# Patient Record
Sex: FEMALE | Race: WHITE | NOT HISPANIC OR LATINO | Employment: OTHER | URBAN - NONMETROPOLITAN AREA
[De-identification: names, ages, dates, MRNs, and addresses within clinical notes are randomized per-mention and may not be internally consistent; named-entity substitution may affect disease eponyms.]

---

## 2017-03-19 ENCOUNTER — PATIENT MESSAGE (OUTPATIENT)
Dept: INTERNAL MEDICINE | Facility: CLINIC | Age: 62
End: 2017-03-19

## 2017-03-20 ENCOUNTER — TELEPHONE (OUTPATIENT)
Dept: INTERNAL MEDICINE | Facility: CLINIC | Age: 62
End: 2017-03-20

## 2017-03-20 RX ORDER — BUPROPION HYDROCHLORIDE 150 MG/1
150 TABLET ORAL DAILY
Qty: 90 TABLET | Refills: 1 | Status: SHIPPED | OUTPATIENT
Start: 2017-03-20 | End: 2017-10-02 | Stop reason: SDUPTHER

## 2017-03-20 NOTE — TELEPHONE ENCOUNTER
----- Message from Lyssa Neal sent at 3/19/2017 11:41 AM EDT -----  Regarding: Prescription Question  Contact: [phone]    My prescription service, [facility], has sent you a renewal request for my buPROPion prescription.     While in [place], I have let it lapse and didn't think about it expiring. I do think it helps and it doesn't make me as tired as the previous prescriptions that I have tried.     I won't be back in [place] until mid-April. If you could renew this I'd appreciate it!    Thank you!    Shauna

## 2017-10-03 RX ORDER — BUPROPION HYDROCHLORIDE 150 MG/1
150 TABLET ORAL EVERY MORNING
Qty: 30 TABLET | Refills: 0 | Status: SHIPPED | OUTPATIENT
Start: 2017-10-03 | End: 2017-11-29 | Stop reason: SDUPTHER

## 2017-10-16 ENCOUNTER — TELEPHONE (OUTPATIENT)
Dept: INTERNAL MEDICINE | Facility: CLINIC | Age: 62
End: 2017-10-16

## 2017-10-16 NOTE — TELEPHONE ENCOUNTER
PLEASE RETURN CALL. PATIENT WOULD LIKE TO TALK TO YOU ABOUT AN APPOINTMENT AND HER RX. (WOULDN'T GIVE ME ANYMORE INFORMATION).

## 2017-11-29 ENCOUNTER — OFFICE VISIT (OUTPATIENT)
Dept: INTERNAL MEDICINE | Facility: CLINIC | Age: 62
End: 2017-11-29

## 2017-11-29 VITALS
BODY MASS INDEX: 18.66 KG/M2 | HEIGHT: 65 IN | DIASTOLIC BLOOD PRESSURE: 62 MMHG | HEART RATE: 81 BPM | TEMPERATURE: 97.3 F | WEIGHT: 112 LBS | SYSTOLIC BLOOD PRESSURE: 124 MMHG | OXYGEN SATURATION: 97 %

## 2017-11-29 DIAGNOSIS — F32.1 MAJOR DEPRESSIVE DISORDER, SINGLE EPISODE, MODERATE (HCC): Primary | ICD-10-CM

## 2017-11-29 PROCEDURE — 99213 OFFICE O/P EST LOW 20 MIN: CPT | Performed by: INTERNAL MEDICINE

## 2017-11-29 RX ORDER — BUPROPION HYDROCHLORIDE 150 MG/1
150 TABLET ORAL EVERY MORNING
Qty: 90 TABLET | Refills: 1 | Status: SHIPPED | OUTPATIENT
Start: 2017-11-29 | End: 2018-07-05 | Stop reason: SDUPTHER

## 2017-11-29 NOTE — PROGRESS NOTES
"Chief Complaint   Patient presents with   • Follow-up     for depressive disorder. No new complaints.      Subjective   Lyssa Neal is a 62 y.o. female.     HPI Comments: Patient is here today for follow-up of depression.  She has not been seen for almost one year now.  She was placed on wellbutrin last visit almost a yr ago and never returned for a followup.  She feels the med helps her anxiety and depression.   She is sleeping well.   [place].  She also gets her mammogram done there.   She is taking vit D and viactiv, but is not always consistant.   She declines flu vaccine today.  Lives in [place] half the yr. She does not want labs done until she returns next year in the spring.  Has no complaints today.       The following portions of the patient's history were reviewed and updated as appropriate: allergies, current medications, past family history, past medical history, past social history, past surgical history and problem list.    Review of Systems   Respiratory: Negative for shortness of breath.    Cardiovascular: Negative for chest pain and palpitations.   Gastrointestinal: Negative.    Psychiatric/Behavioral: Negative for dysphoric mood and sleep disturbance. The patient is not nervous/anxious.    All other systems reviewed and are negative.      Objective   /62  Pulse 81  Temp 97.3 °F (36.3 °C)  Ht 64.5\" (163.8 cm)  Wt 112 lb (50.8 kg)  SpO2 97%  BMI 18.93 kg/m2  Body mass index is 18.93 kg/(m^2).  Physical Exam   Constitutional: She is oriented to person, place, and time. She appears well-developed and well-nourished.   HENT:   Head: Normocephalic and atraumatic.   Eyes: EOM are normal. Pupils are equal, round, and reactive to light.   Neck: Normal range of motion. Neck supple. No thyromegaly present.   Cardiovascular: Normal rate, regular rhythm and normal heart sounds.    No murmur heard.  Pulmonary/Chest: Effort normal and breath sounds normal. No " respiratory distress.   Lymphadenopathy:     She has no cervical adenopathy.   Neurological: She is alert and oriented to person, place, and time. No cranial nerve deficit.   Psychiatric: She has a normal mood and affect. Judgment normal.   Nursing note and vitals reviewed.      Assessment/Plan   Lyssa Neal is here today and the following problems have been addressed:      Lyssa was seen today for follow-up.    Diagnoses and all orders for this visit:    Major depressive disorder, single episode, moderate    Other orders  -     buPROPion XL (WELLBUTRIN XL) 150 MG 24 hr tablet; Take 1 tablet by mouth Every Morning.    Recommend calcium 600 mg AM and PM  Vit D 2000 u daily  No med changes  Eat a HH diet  Exercise 30 min 5 days weekly  RF given on wellbutrin    RTC 6 mo for routine physical and labs  Please note that portions of this note were completed with a voice recognition program.  Efforts were made to edit dictation, but occasionally words are mistranscribed.

## 2017-12-13 ENCOUNTER — OFFICE VISIT (OUTPATIENT)
Dept: INTERNAL MEDICINE | Facility: CLINIC | Age: 62
End: 2017-12-13

## 2017-12-13 VITALS
WEIGHT: 105 LBS | TEMPERATURE: 98 F | HEART RATE: 91 BPM | SYSTOLIC BLOOD PRESSURE: 102 MMHG | HEIGHT: 64 IN | DIASTOLIC BLOOD PRESSURE: 60 MMHG | OXYGEN SATURATION: 97 % | BODY MASS INDEX: 17.93 KG/M2

## 2017-12-13 DIAGNOSIS — J06.9 ACUTE URI: Primary | ICD-10-CM

## 2017-12-13 PROCEDURE — 99213 OFFICE O/P EST LOW 20 MIN: CPT | Performed by: PHYSICIAN ASSISTANT

## 2017-12-13 RX ORDER — MELATONIN
2000 DAILY
COMMUNITY
End: 2020-06-15

## 2017-12-13 RX ORDER — DEXTROMETHORPHAN HYDROBROMIDE AND PROMETHAZINE HYDROCHLORIDE 15; 6.25 MG/5ML; MG/5ML
5 SYRUP ORAL NIGHTLY PRN
Qty: 180 ML | Refills: 0 | Status: SHIPPED | OUTPATIENT
Start: 2017-12-13 | End: 2018-05-30

## 2017-12-13 NOTE — PROGRESS NOTES
"Subjective     Chief Complaint: sore throat, cough    History of Present Illness     Lyssa Neal is a 62 y.o. female presents with 2 days sore throat, cough, rhinorrhea, hoarse voice.  Not sleeping well due to cough.  States her  was sick last week.  Has been using DayQuil and NyQuil OTC with some relief.  Denies fever, chills, body aches, ear pain, sinus pain, dyspnea, chest pain, N/V/D.    The following portions of the patient's history were reviewed and updated as appropriate: current medications, allergies, PMH.    Review of Systems   Constitutional: Negative for chills, fatigue and fever.   HENT: Positive for rhinorrhea, sore throat and voice change. Negative for ear pain and sinus pressure.    Respiratory: Positive for cough. Negative for shortness of breath and wheezing.    Cardiovascular: Negative.    Gastrointestinal: Negative.        Objective     Vitals:    12/13/17 1343   BP: 102/60   Pulse: 91   Temp: 98 °F (36.7 °C)   SpO2: 97%   Weight: 47.6 kg (105 lb)   Height: 163.8 cm (64.49\")       Physical Exam   Constitutional: She appears well-developed and well-nourished. No distress.   Patient appears well.   HENT:   Head: Normocephalic and atraumatic.   Right Ear: Tympanic membrane and external ear normal.   Left Ear: Tympanic membrane normal.   Nose: Rhinorrhea present. Right sinus exhibits no maxillary sinus tenderness and no frontal sinus tenderness. Left sinus exhibits no maxillary sinus tenderness and no frontal sinus tenderness.   Mouth/Throat: Mucous membranes are dry. Posterior oropharyngeal erythema present. No oropharyngeal exudate or posterior oropharyngeal edema.   Cardiovascular: Normal rate, regular rhythm and normal heart sounds.    Pulmonary/Chest: Effort normal.   Lymphadenopathy:     She has no cervical adenopathy.            Assessment/Plan     Diagnoses and all orders for this visit:    Acute URI  -     promethazine-dextromethorphan (PROMETHAZINE-DM) 6.25-15 MG/5ML syrup; Take " 5 mL by mouth At Night As Needed for Cough.    Patient encouraged to rest and stay well-hydrated.  May continue using cough and cold medicine OTC during the day.  May use Promethazine DM syrup to help her rest at night.  Gargle with warm salt water, Chloraseptic spray, Tylenol/Motrin for throat pain.  May consider antibiotic later if symptoms do not begin to improve.  RTC if symptoms worsen or fail to improve.    Nemo Darling PA-C  12/13/2017         Please note that portions of this note were completed with a voice recognition program. Efforts were made to edit dictation, but occasionally words are mistranscribed.

## 2018-05-30 ENCOUNTER — OFFICE VISIT (OUTPATIENT)
Dept: INTERNAL MEDICINE | Facility: CLINIC | Age: 63
End: 2018-05-30

## 2018-05-30 VITALS
TEMPERATURE: 97.6 F | DIASTOLIC BLOOD PRESSURE: 66 MMHG | BODY MASS INDEX: 17.66 KG/M2 | WEIGHT: 106 LBS | OXYGEN SATURATION: 94 % | SYSTOLIC BLOOD PRESSURE: 124 MMHG | HEIGHT: 65 IN | HEART RATE: 90 BPM

## 2018-05-30 DIAGNOSIS — M19.90 ARTHRITIS: ICD-10-CM

## 2018-05-30 DIAGNOSIS — M81.0 AGE-RELATED OSTEOPOROSIS WITHOUT CURRENT PATHOLOGICAL FRACTURE: ICD-10-CM

## 2018-05-30 DIAGNOSIS — Z00.00 PHYSICAL EXAM: Primary | ICD-10-CM

## 2018-05-30 LAB
25(OH)D3+25(OH)D2 SERPL-MCNC: 40.9 NG/ML
ALBUMIN SERPL-MCNC: 4.4 G/DL (ref 3.5–5)
ALBUMIN/GLOB SERPL: 1.3 G/DL (ref 1–2)
ALP SERPL-CCNC: 52 U/L (ref 38–126)
ALT SERPL-CCNC: 32 U/L (ref 13–69)
AST SERPL-CCNC: 33 U/L (ref 15–46)
BASOPHILS # BLD AUTO: 0.1 10*3/MM3 (ref 0–0.2)
BASOPHILS NFR BLD AUTO: 1.6 % (ref 0–2.5)
BILIRUB SERPL-MCNC: 0.4 MG/DL (ref 0.2–1.3)
BUN SERPL-MCNC: 7 MG/DL (ref 7–20)
BUN/CREAT SERPL: 14 (ref 7.1–23.5)
CALCIUM SERPL-MCNC: 9.8 MG/DL (ref 8.4–10.2)
CHLORIDE SERPL-SCNC: 98 MMOL/L (ref 98–107)
CHOLEST SERPL-MCNC: 163 MG/DL (ref 0–199)
CHOLEST/HDLC SERPL: 1.81 {RATIO}
CO2 SERPL-SCNC: 26 MMOL/L (ref 26–30)
CREAT SERPL-MCNC: 0.5 MG/DL (ref 0.6–1.3)
EOSINOPHIL # BLD AUTO: 0.39 10*3/MM3 (ref 0–0.7)
EOSINOPHIL NFR BLD AUTO: 6.4 % (ref 0–7)
ERYTHROCYTE [DISTWIDTH] IN BLOOD BY AUTOMATED COUNT: 12.4 % (ref 11.5–14.5)
GFR SERPLBLD CREATININE-BSD FMLA CKD-EPI: 124 ML/MIN/1.73
GFR SERPLBLD CREATININE-BSD FMLA CKD-EPI: >150 ML/MIN/1.73
GLOBULIN SER CALC-MCNC: 3.4 GM/DL
GLUCOSE SERPL-MCNC: 86 MG/DL (ref 74–98)
HCT VFR BLD AUTO: 37.2 % (ref 37–47)
HDLC SERPL-MCNC: 90 MG/DL (ref 40–60)
HGB BLD-MCNC: 12.4 G/DL (ref 12–16)
IMM GRANULOCYTES # BLD: 0.02 10*3/MM3 (ref 0–0.06)
IMM GRANULOCYTES NFR BLD: 0.3 % (ref 0–0.6)
LDLC SERPL CALC-MCNC: 54 MG/DL (ref 0–99)
LYMPHOCYTES # BLD AUTO: 1.43 10*3/MM3 (ref 0.6–3.4)
LYMPHOCYTES NFR BLD AUTO: 23.3 % (ref 10–50)
MCH RBC QN AUTO: 31.8 PG (ref 27–31)
MCHC RBC AUTO-ENTMCNC: 33.3 G/DL (ref 30–37)
MCV RBC AUTO: 95.4 FL (ref 81–99)
MONOCYTES # BLD AUTO: 0.7 10*3/MM3 (ref 0–0.9)
MONOCYTES NFR BLD AUTO: 11.4 % (ref 0–12)
NEUTROPHILS # BLD AUTO: 3.5 10*3/MM3 (ref 2–6.9)
NEUTROPHILS NFR BLD AUTO: 57 % (ref 37–80)
NRBC BLD AUTO-RTO: 0 /100 WBC (ref 0–0)
PLATELET # BLD AUTO: 256 10*3/MM3 (ref 130–400)
POTASSIUM SERPL-SCNC: 4.4 MMOL/L (ref 3.5–5.1)
PROT SERPL-MCNC: 7.8 G/DL (ref 6.3–8.2)
RBC # BLD AUTO: 3.9 10*6/MM3 (ref 4.2–5.4)
SODIUM SERPL-SCNC: 137 MMOL/L (ref 137–145)
TRIGL SERPL-MCNC: 95 MG/DL
VLDLC SERPL CALC-MCNC: 19 MG/DL
WBC # BLD AUTO: 6.14 10*3/MM3 (ref 4.8–10.8)

## 2018-05-30 PROCEDURE — 99396 PREV VISIT EST AGE 40-64: CPT | Performed by: INTERNAL MEDICINE

## 2018-05-30 NOTE — PROGRESS NOTES
Chief Complaint   Patient presents with   • Annual Exam     Physical only. Pt state her arthritis is really affecting her joints, would like to know what she should do?     Subjective   Lyssa Neal is a 62 y.o. female.     Pt here today for annual physical.   PMH of OP and depression.   She sees her GYN for pap and mammogram every summer.  Last colonoscopy was in 2013 and was normal.    She gets occasional RLQ pain.  Has been seen for this several times and had a colonoscopy for this pain and it was all normal.  Her depression has been well controlled with use of wellbutrin.  She sleeps about 6 hours a night.    She is taking her calcium with vit D.   Has not been exercising.  When she travels to [place] she exercises regularly.    Tries to eat a HH diet.   She sees the eye doctor and dentist regularly.   The DJD in her hands is quite bothersome.  She is getting nodules at DIP joints. The pain is not constant, occurs when her fingers are pulled or grabbed.   Has also noted she is not remembering things as quickly.  No dementia in her family.   She does wear her seatbelt and does not text and drive.            The following portions of the patient's history were reviewed and updated as appropriate: allergies, current medications, past family history, past medical history, past social history, past surgical history and problem list.    Review of Systems   Constitutional: Negative for activity change, appetite change and unexpected weight change.   Respiratory: Negative for shortness of breath.    Cardiovascular: Negative for chest pain and palpitations.   Gastrointestinal: Negative.    Genitourinary: Negative.    Musculoskeletal: Positive for arthralgias.   Allergic/Immunologic: Negative for environmental allergies.   Psychiatric/Behavioral: Negative for dysphoric mood and sleep disturbance. The patient is not nervous/anxious.    All other systems reviewed and are negative.      Objective   /66   Pulse 90    "Temp 97.6 °F (36.4 °C)   Ht 163.8 cm (64.5\")   Wt 48.1 kg (106 lb)   SpO2 94%   BMI 17.91 kg/m²   Body mass index is 17.91 kg/m².  Physical Exam   Constitutional: She is oriented to person, place, and time. She appears well-developed and well-nourished.   HENT:   Head: Normocephalic and atraumatic.   Right Ear: External ear normal.   Left Ear: External ear normal.   Mouth/Throat: Oropharynx is clear and moist.   Eyes: Conjunctivae and EOM are normal. Pupils are equal, round, and reactive to light.   Neck: Normal range of motion. Neck supple. No thyromegaly present.   Cardiovascular: Normal rate, regular rhythm, normal heart sounds and intact distal pulses.    No murmur heard.  Pulmonary/Chest: Effort normal and breath sounds normal. No respiratory distress.   Abdominal: Soft. Bowel sounds are normal.   Musculoskeletal: Normal range of motion. She exhibits no edema.   Lymphadenopathy:     She has no cervical adenopathy.   Neurological: She is alert and oriented to person, place, and time. She displays normal reflexes. No cranial nerve deficit.   Skin: No rash noted.   Psychiatric: She has a normal mood and affect. Judgment normal.   Nursing note and vitals reviewed.      Assessment/Plan   Lyssa Neal is here today and the following problems have been addressed:      Lyssa was seen today for annual exam.    Diagnoses and all orders for this visit:    Physical exam  -     CBC & Differential  -     Comprehensive Metabolic Panel  -     Lipid Panel With / Chol / HDL Ratio  -     Vitamin D 25 Hydroxy    Age-related osteoporosis without current pathological fracture  -     Vitamin D 25 Hydroxy    Arthritis    Labs as noted  Recommend avoidance of processed foods  Exercise for 30 minutes 5 days a week as tolerated  Recommend annual eye doctor appointment, or as necessary  See your dentist twice a year.  Recommend that you brush teeth twice daily and floss minimum of once daily  Recommend regular seatbelt use  Do not " text or use phone while driving  Recommend tylenol ES or aleve for hand DJD pain  See GYN for pap and mammogram    RTC one yr for medicare wellness exam        Please note that portions of this note were completed with a voice recognition program.  Efforts were made to edit dictation, but occasionally words are mistranscribed.

## 2018-07-05 RX ORDER — BUPROPION HYDROCHLORIDE 150 MG/1
TABLET ORAL
Qty: 90 TABLET | Refills: 1 | Status: SHIPPED | OUTPATIENT
Start: 2018-07-05 | End: 2018-08-30 | Stop reason: SDUPTHER

## 2018-08-30 RX ORDER — BUPROPION HYDROCHLORIDE 150 MG/1
150 TABLET ORAL EVERY MORNING
Qty: 90 TABLET | Refills: 1 | Status: SHIPPED | OUTPATIENT
Start: 2018-08-30 | End: 2019-04-04 | Stop reason: SDUPTHER

## 2019-04-04 RX ORDER — BUPROPION HYDROCHLORIDE 150 MG/1
150 TABLET ORAL EVERY MORNING
Qty: 90 TABLET | Refills: 0 | Status: SHIPPED | OUTPATIENT
Start: 2019-04-04 | End: 2019-04-09 | Stop reason: SDUPTHER

## 2019-04-10 RX ORDER — BUPROPION HYDROCHLORIDE 150 MG/1
150 TABLET ORAL EVERY MORNING
Qty: 90 TABLET | Refills: 1 | Status: SHIPPED | OUTPATIENT
Start: 2019-04-10 | End: 2019-12-05 | Stop reason: SDUPTHER

## 2019-07-01 ENCOUNTER — RESULTS ENCOUNTER (OUTPATIENT)
Dept: INTERNAL MEDICINE | Facility: CLINIC | Age: 64
End: 2019-07-01

## 2019-07-01 ENCOUNTER — OFFICE VISIT (OUTPATIENT)
Dept: INTERNAL MEDICINE | Facility: CLINIC | Age: 64
End: 2019-07-01

## 2019-07-01 VITALS
DIASTOLIC BLOOD PRESSURE: 64 MMHG | HEIGHT: 65 IN | OXYGEN SATURATION: 98 % | WEIGHT: 105 LBS | HEART RATE: 89 BPM | TEMPERATURE: 97.5 F | SYSTOLIC BLOOD PRESSURE: 118 MMHG | BODY MASS INDEX: 17.49 KG/M2

## 2019-07-01 DIAGNOSIS — M81.0 AGE-RELATED OSTEOPOROSIS WITHOUT CURRENT PATHOLOGICAL FRACTURE: ICD-10-CM

## 2019-07-01 DIAGNOSIS — Z00.00 INITIAL MEDICARE ANNUAL WELLNESS VISIT: Primary | ICD-10-CM

## 2019-07-01 DIAGNOSIS — M19.90 ARTHRITIS: ICD-10-CM

## 2019-07-01 DIAGNOSIS — F32.1 MAJOR DEPRESSIVE DISORDER, SINGLE EPISODE, MODERATE (HCC): ICD-10-CM

## 2019-07-01 DIAGNOSIS — Z12.11 SCREENING FOR COLON CANCER: ICD-10-CM

## 2019-07-01 LAB
25(OH)D3+25(OH)D2 SERPL-MCNC: 55.2 NG/ML
ALBUMIN SERPL-MCNC: 4 G/DL (ref 3.5–5)
ALBUMIN/GLOB SERPL: 1.3 G/DL (ref 1–2)
ALP SERPL-CCNC: 38 U/L (ref 38–126)
ALT SERPL-CCNC: 34 U/L (ref 13–69)
AST SERPL-CCNC: 31 U/L (ref 15–46)
BASOPHILS # BLD AUTO: 0.07 10*3/MM3 (ref 0–0.2)
BASOPHILS NFR BLD AUTO: 1.2 % (ref 0–1.5)
BILIRUB SERPL-MCNC: 0.2 MG/DL (ref 0.2–1.3)
BUN SERPL-MCNC: 10 MG/DL (ref 7–20)
BUN/CREAT SERPL: 20 (ref 7.1–23.5)
CALCIUM SERPL-MCNC: 8.4 MG/DL (ref 8.4–10.2)
CHLORIDE SERPL-SCNC: 99 MMOL/L (ref 98–107)
CO2 SERPL-SCNC: 29 MMOL/L (ref 26–30)
CREAT SERPL-MCNC: 0.5 MG/DL (ref 0.6–1.3)
EOSINOPHIL # BLD AUTO: 0.11 10*3/MM3 (ref 0–0.4)
EOSINOPHIL NFR BLD AUTO: 1.9 % (ref 0.3–6.2)
ERYTHROCYTE [DISTWIDTH] IN BLOOD BY AUTOMATED COUNT: 13 % (ref 12.3–15.4)
GLOBULIN SER CALC-MCNC: 3.2 GM/DL
GLUCOSE SERPL-MCNC: 75 MG/DL (ref 74–98)
HCT VFR BLD AUTO: 38 % (ref 34–46.6)
HGB BLD-MCNC: 12.3 G/DL (ref 12–15.9)
IMM GRANULOCYTES # BLD AUTO: 0.01 10*3/MM3 (ref 0–0.05)
IMM GRANULOCYTES NFR BLD AUTO: 0.2 % (ref 0–0.5)
LYMPHOCYTES # BLD AUTO: 1.36 10*3/MM3 (ref 0.7–3.1)
LYMPHOCYTES NFR BLD AUTO: 22.9 % (ref 19.6–45.3)
MCH RBC QN AUTO: 31 PG (ref 26.6–33)
MCHC RBC AUTO-ENTMCNC: 32.4 G/DL (ref 31.5–35.7)
MCV RBC AUTO: 95.7 FL (ref 79–97)
MONOCYTES # BLD AUTO: 0.82 10*3/MM3 (ref 0.1–0.9)
MONOCYTES NFR BLD AUTO: 13.8 % (ref 5–12)
NEUTROPHILS # BLD AUTO: 3.57 10*3/MM3 (ref 1.7–7)
NEUTROPHILS NFR BLD AUTO: 60 % (ref 42.7–76)
NRBC BLD AUTO-RTO: 0 /100 WBC (ref 0–0.2)
PLATELET # BLD AUTO: 281 10*3/MM3 (ref 140–450)
POTASSIUM SERPL-SCNC: 4.3 MMOL/L (ref 3.5–5.1)
PROT SERPL-MCNC: 7.2 G/DL (ref 6.3–8.2)
RBC # BLD AUTO: 3.97 10*6/MM3 (ref 3.77–5.28)
SODIUM SERPL-SCNC: 137 MMOL/L (ref 137–145)
TSH SERPL DL<=0.005 MIU/L-ACNC: 2.2 MIU/ML (ref 0.47–4.68)
WBC # BLD AUTO: 5.94 10*3/MM3 (ref 3.4–10.8)

## 2019-07-01 PROCEDURE — G0402 INITIAL PREVENTIVE EXAM: HCPCS | Performed by: INTERNAL MEDICINE

## 2019-07-01 PROCEDURE — G0404 EKG TRACING FOR INITIAL PREV: HCPCS | Performed by: INTERNAL MEDICINE

## 2019-07-01 NOTE — PROGRESS NOTES
QUICK REFERENCE INFORMATION:  The ABCs of the Annual Wellness Visit    Subsequent Medicare Wellness Visit     HEALTH RISK ASSESSMENT    : 1955    Recent Hospitalizations:  No hospitalization(s) within the last year..  ccc      Current Medical Providers:  Patient Care Team:  Vermeesch, Marilyn K, MD as PCP - General        Smoking Status:  Social History     Tobacco Use   Smoking Status Never Smoker       Alcohol Consumption:  Social History     Substance and Sexual Activity   Alcohol Use Yes    Comment: Social       Depression Screen:   PHQ-2/PHQ-9 Depression Screening 2019   Little interest or pleasure in doing things 0   Feeling down, depressed, or hopeless 2   Trouble falling or staying asleep, or sleeping too much 0   Feeling tired or having little energy 1   Poor appetite or overeating 0   Feeling bad about yourself - or that you are a failure or have let yourself or your family down 0   Trouble concentrating on things, such as reading the newspaper or watching television 0   Moving or speaking so slowly that other people could have noticed. Or the opposite - being so fidgety or restless that you have been moving around a lot more than usual 0   Thoughts that you would be better off dead, or of hurting yourself in some way 0   Total Score 3   If you checked off any problems, how difficult have these problems made it for you to do your work, take care of things at home, or get along with other people? Not difficult at all       Health Habits and Functional and Cognitive Screening:  Functional & Cognitive Status 2019   Do you have difficulty preparing food and eating? No   Do you have difficulty bathing yourself, getting dressed or grooming yourself? No   Do you have difficulty using the toilet? No   Do you have difficulty moving around from place to place? No   Do you have trouble with steps or getting out of a bed or a chair? No   In the past year have you fallen or experienced a near fall? No    Current Diet Well Balanced Diet   Dental Exam Up to date   Eye Exam Up to date   Exercise (times per week) 0 times per week   Current Exercise Activities Include None   Do you need help using the phone?  No   Are you deaf or do you have serious difficulty hearing?  No   Do you need help with transportation? No   Do you need help shopping? No   Do you need help preparing meals?  No   Do you need help with housework?  No   Do you need help with laundry? No   Do you need help taking your medications? No   Do you need help managing money? No   Do you ever drive or ride in a car without wearing a seat belt? No   Have you felt unusual stress, anger or loneliness in the last month? Yes   Who do you live with? Spouse   If you need help, do you have trouble finding someone available to you? No   Do you have difficulty concentrating, remembering or making decisions? No           Does the patient have evidence of cognitive impairment? No    Asiprin use counseling: Does not need ASA (and currently is not on it)      Recent Lab Results:    Lab Results   Component Value Date    GLU 86 05/30/2018        Lab Results   Component Value Date    TRIG 95 05/30/2018    HDL 90 (H) 05/30/2018    VLDL 19 05/30/2018           Age-appropriate Screening Schedule:  Refer to the list below for future screening recommendations based on patient's age, sex and/or medical conditions. Orders for these recommended tests are listed in the plan section. The patient has been provided with a written plan.    Health Maintenance   Topic Date Due   • DXA SCAN  08/24/2016   • MAMMOGRAM  10/05/2017   • PNEUMOCOCCAL VACCINES (65+ LOW/MEDIUM RISK) (1 of 2 - PCV13) 07/01/2019 (Originally 8/10/2018)   • INFLUENZA VACCINE  08/01/2019   • PAP SMEAR  05/31/2020   • COLONOSCOPY  07/31/2023   • TDAP/TD VACCINES  Discontinued   • ZOSTER VACCINE  Discontinued        Subjective   History of Present Illness    Lyssa Neal is a 63 y.o. female who presents for an  Annual Wellness Visit.  PMH of DJD, OP, depression and anemia. She has some stressors with her family and neighbors currently.  She does not always take her calcium and vit D. She remains on estradiol 2 mg daily, she will see her GYN next month.  She will have a DEXA and mammogram per GYN.  She is using CBD oil for her DJD and it really helps her a lot.  She admits that she has a few alcoholic beverages every night.    The following portions of the patient's history were reviewed and updated as appropriate: allergies, current medications, past family history, past medical history, past social history, past surgical history and problem list.    Outpatient Medications Prior to Visit   Medication Sig Dispense Refill   • CBD (cannabidiol) oral oil Take  by mouth.     • Biotin 1 MG capsule Take  by mouth.     • buPROPion XL (WELLBUTRIN XL) 150 MG 24 hr tablet Take 1 tablet by mouth Every Morning. 90 tablet 1   • calcium citrate-vitamin d (CITRACAL) 200-250 MG-UNIT tablet tablet Take  by mouth Daily.     • cholecalciferol (VITAMIN D3) 1000 units tablet Take 2,000 Units by mouth Daily.     • estradiol (ESTRACE) 2 MG tablet Take 2 mg by mouth daily.     • Multiple Vitamins-Minerals (CENTRUM SILVER ULTRA WOMENS PO) Take  by mouth daily.     • Omega-3 Fatty Acids ([employer] NATURAL FISH OIL) 1000 MG capsule Take  by mouth daily.     • Probiotic Product (PROBIOTIC DAILY PO) Take  by mouth.       No facility-administered medications prior to visit.        Patient Active Problem List   Diagnosis   • Actinic keratosis   • Arthritis   • Osteoporosis   • Osteoarthritis of finger of left hand   • Major depressive disorder, single episode, moderate (CMS/HCC)   • Initial Medicare annual wellness visit   • Screening for colon cancer       Advance Care Planning:  Patient does not have an advance directive - information provided to the patient today    Identification of Risk Factors:  Risk factors include: weight , inactivity and alcohol  use.    Review of Systems   Constitutional: Negative.    HENT: Positive for hearing loss.         Mild hearing loss in right ear   Eyes: Negative.    Respiratory: Negative.    Cardiovascular: Negative.    Gastrointestinal: Negative.    Endocrine: Negative.    Genitourinary: Negative.    Musculoskeletal: Positive for arthralgias.   Skin: Negative.    Allergic/Immunologic: Positive for environmental allergies.   Neurological: Negative.    Hematological: Negative.    Psychiatric/Behavioral: The patient is nervous/anxious.        Compared to one year ago, the patient feels her physical health is the same.  Compared to one year ago, the patient feels her mental health is the same.    Objective     Physical Exam   Constitutional: She is oriented to person, place, and time. She appears well-developed and well-nourished. No distress.   Very pleasant thin female who appears her stated age, no distress today   HENT:   Head: Normocephalic and atraumatic.   Right Ear: External ear normal.   Left Ear: External ear normal.   Mouth/Throat: Oropharynx is clear and moist.   Eyes: Conjunctivae and EOM are normal. Pupils are equal, round, and reactive to light.   Neck: Normal range of motion. Neck supple. No thyromegaly present.   Cardiovascular: Normal rate, regular rhythm, normal heart sounds and intact distal pulses.   No murmur heard.  No carotid bruits   Pulmonary/Chest: Effort normal and breath sounds normal. No respiratory distress. She has no wheezes.   Abdominal: Soft. Bowel sounds are normal. She exhibits no distension. There is no tenderness.   Musculoskeletal: Normal range of motion. She exhibits no edema.   Lymphadenopathy:     She has no cervical adenopathy.   Neurological: She is alert and oriented to person, place, and time. She displays normal reflexes. No cranial nerve deficit. She exhibits normal muscle tone. Coordination normal.   Psychiatric: Her behavior is normal. Judgment and thought content normal.   Overall  "pleasant mood, however, she appears anxious   Nursing note and vitals reviewed.    ECG 12 Lead  Date/Time: 7/1/2019 5:07 PM  Performed by: Vermeesch, Marilyn K, MD  Authorized by: Vermeesch, Marilyn K, MD   Comparison: not compared with previous ECG   Rhythm: sinus rhythm  Rate: normal  BPM: 75  Conduction: conduction normal  ST Segments: ST segments normal  T Waves: T waves normal  QRS axis: normal  Other: no other findings    Clinical impression: normal ECG            Vitals:    07/01/19 1328   BP: 118/64   Pulse: 89   Temp: 97.5 °F (36.4 °C)   SpO2: 98%   Weight: 47.6 kg (105 lb)   Height: 163.8 cm (64.5\")   PainSc: 0-No pain       Patient's Body mass index is 17.74 kg/m². BMI is below normal parameters. Recommendations include: consider ensure/boost supplement daily.      Assessment/Plan   Patient Self-Management and Personalized Health Advice  The patient has been provided with information about: exercise, alcohol use, designing advance directives and supplements and preventive services including:   · Advance directive, Alcohol use counseling completed, Colorectal cancer screening, cologuard test ordered, Diabetes screening, see lab orders, Exercise counseling provided, recommend Hep A and shingles vaccines.    Visit Diagnoses:    ICD-10-CM ICD-9-CM   1. Initial Medicare annual wellness visit Z00.00 V70.0   2. Age-related osteoporosis without current pathological fracture M81.0 733.01   3. Arthritis M19.90 716.90   4. Major depressive disorder, single episode, moderate (CMS/HCC) F32.1 296.22   5. Screening for colon cancer Z12.11 V76.51       Orders Placed This Encounter   Procedures   • Comprehensive Metabolic Panel   • TSH   • Vitamin D 25 Hydroxy   • Cologuard - Stool, Per Rectum     Standing Status:   Future     Standing Expiration Date:   7/1/2020   • CBC & Differential     Order Specific Question:   Manual Differential     Answer:   No       Outpatient Encounter Medications as of 7/1/2019   Medication Sig " Dispense Refill   • CBD (cannabidiol) oral oil Take  by mouth.     • Biotin 1 MG capsule Take  by mouth.     • buPROPion XL (WELLBUTRIN XL) 150 MG 24 hr tablet Take 1 tablet by mouth Every Morning. 90 tablet 1   • calcium citrate-vitamin d (CITRACAL) 200-250 MG-UNIT tablet tablet Take  by mouth Daily.     • cholecalciferol (VITAMIN D3) 1000 units tablet Take 2,000 Units by mouth Daily.     • estradiol (ESTRACE) 2 MG tablet Take 2 mg by mouth daily.     • Multiple Vitamins-Minerals (CENTRUM SILVER ULTRA WOMENS PO) Take  by mouth daily.     • Omega-3 Fatty Acids ([employer] NATURAL FISH OIL) 1000 MG capsule Take  by mouth daily.     • Probiotic Product (PROBIOTIC DAILY PO) Take  by mouth.       No facility-administered encounter medications on file as of 7/1/2019.        Reviewed use of high risk medication in the elderly: yes  Reviewed for potential of harmful drug interactions in the elderly: not applicable     Given info on advance directives, request copy for chart  She declines pneumovax  Recommend Hep A and shingles vaccine at pharmacy  Labs to be done when fasting  Cologuard ordered for colon cancer screening  She will see GYN and have her mammo and DEXA-she receives her hormone replacement therapy from gynecologist  Encouraged her to take calcium and vit D daily  Exercise for 30 min 5 days a week  We discussed need to cut back on daily consumption of alcohol  She feels that CBD oil is helpful for her underlying anxiety symptoms  EKG is normal    Follow Up:  Return in about 1 year (around 7/1/2020) for Medicare Wellness.     An After Visit Summary and PPPS with all of these plans were given to the patient.

## 2019-12-05 RX ORDER — BUPROPION HYDROCHLORIDE 150 MG/1
150 TABLET ORAL EVERY MORNING
Qty: 90 TABLET | Refills: 1 | Status: SHIPPED | OUTPATIENT
Start: 2019-12-05 | End: 2020-04-06 | Stop reason: SDUPTHER

## 2020-01-10 ENCOUNTER — OFFICE VISIT (OUTPATIENT)
Dept: INTERNAL MEDICINE | Facility: CLINIC | Age: 65
End: 2020-01-10

## 2020-01-10 VITALS
HEART RATE: 77 BPM | OXYGEN SATURATION: 98 % | SYSTOLIC BLOOD PRESSURE: 126 MMHG | HEIGHT: 64 IN | WEIGHT: 101 LBS | DIASTOLIC BLOOD PRESSURE: 72 MMHG | BODY MASS INDEX: 17.24 KG/M2 | TEMPERATURE: 98.2 F

## 2020-01-10 DIAGNOSIS — L03.011 CELLULITIS OF FINGER OF RIGHT HAND: Primary | ICD-10-CM

## 2020-01-10 PROCEDURE — 99213 OFFICE O/P EST LOW 20 MIN: CPT | Performed by: NURSE PRACTITIONER

## 2020-01-10 RX ORDER — SULFAMETHOXAZOLE AND TRIMETHOPRIM 800; 160 MG/1; MG/1
1 TABLET ORAL 2 TIMES DAILY
Qty: 14 TABLET | Refills: 0 | Status: SHIPPED | OUTPATIENT
Start: 2020-01-10 | End: 2020-01-17

## 2020-01-10 NOTE — PROGRESS NOTES
"Date: 01/10/2020    Name: Lyssa Neal  : 1955    Chief Complaint:   Chief Complaint   Patient presents with   • Abstract     right middle finger has a wart that has become inflammed and causing pain       HPI:  Lyssa Neal is a 64 y.o. female presents with lesion on her right middle finger.  She reports she believes it was a wart, as she has a history of plantar warts.  She has been scraping it and using over-the-counter medication on open skin for the past week.  It has become swollen, painful, occasionally drains pale yellow fluid.  Rates pain at this time is 7 out of 10.  Denies fever, chills, change in appetite, malaise.    History:  The following portions of the patient's history were reviewed and updated as appropriate: allergies, current medications, past medical history, family history, surgical history, social history and problem list.     ROS:  Review of Systems   Constitutional: Negative.    Respiratory: Negative for cough, shortness of breath and wheezing.    Cardiovascular: Negative for chest pain, palpitations and leg swelling.   Musculoskeletal: Negative for myalgias.   Skin: Negative for pallor and rash.   Neurological: Negative for dizziness, weakness and headache.   Hematological: Does not bruise/bleed easily.       VS:  Vitals:    01/10/20 0907   BP: 126/72   Pulse: 77   Temp: 98.2 °F (36.8 °C)   TempSrc: Temporal   SpO2: 98%   Weight: 45.8 kg (101 lb)   Height: 162.6 cm (64\")     Body mass index is 17.34 kg/m².    PE:  Physical Exam   Constitutional: She is oriented to person, place, and time. She appears well-developed and well-nourished. No distress.   HENT:   Head: Normocephalic.   Mouth/Throat: Oropharynx is clear and moist.   Eyes: Pupils are equal, round, and reactive to light. Conjunctivae are normal.   Cardiovascular: Normal rate, regular rhythm, normal heart sounds and intact distal pulses.   Pulmonary/Chest: Effort normal and breath sounds normal.   Neurological: She is " alert and oriented to person, place, and time.   Skin: Skin is warm. Capillary refill takes less than 2 seconds.   Excoriated circular lesion at proximal interphalangeal joint medial right middle finger.  Surrounded by mild erythema, edema.  No drainage, streaking.  Limited range of motion of right middle finger due to pain and swelling.  No loss of sensation.       Assessment/Plan:  Lyssa was seen today for abstract.    Diagnoses and all orders for this visit:    Cellulitis of finger of right hand  -     sulfamethoxazole-trimethoprim (BACTRIM DS) 800-160 MG per tablet; Take 1 tablet by mouth 2 (Two) Times a Day for 7 days.  -     mupirocin (BACTROBAN) 2 % ointment; Apply  topically to the appropriate area as directed 2 (Two) Times a Day.  -     Ambulatory Referral to Dermatology  - Wash hands before and after touching site  - Keep area clean, warm and dry    Return for Next scheduled follow up.

## 2020-04-06 RX ORDER — BUPROPION HYDROCHLORIDE 150 MG/1
150 TABLET ORAL EVERY MORNING
Qty: 90 TABLET | Refills: 1 | Status: SHIPPED | OUTPATIENT
Start: 2020-04-06 | End: 2020-11-13

## 2020-06-05 ENCOUNTER — HOSPITAL ENCOUNTER (EMERGENCY)
Facility: HOSPITAL | Age: 65
Discharge: HOME | End: 2020-06-05
Attending: EMERGENCY MEDICINE | Admitting: EMERGENCY MEDICINE

## 2020-06-05 VITALS
OXYGEN SATURATION: 98 % | HEIGHT: 64 IN | DIASTOLIC BLOOD PRESSURE: 67 MMHG | HEART RATE: 107 BPM | RESPIRATION RATE: 18 BRPM | TEMPERATURE: 98.5 F | WEIGHT: 105 LBS | SYSTOLIC BLOOD PRESSURE: 144 MMHG | BODY MASS INDEX: 17.93 KG/M2

## 2020-06-05 DIAGNOSIS — S91.311A LACERATION OF RIGHT HEEL, INITIAL ENCOUNTER: Primary | ICD-10-CM

## 2020-06-05 PROCEDURE — 25010000002 TDAP 5-2.5-18.5 LF-MCG/0.5 SUSPENSION: Performed by: PHYSICIAN ASSISTANT

## 2020-06-05 PROCEDURE — 99283 EMERGENCY DEPT VISIT LOW MDM: CPT

## 2020-06-05 PROCEDURE — 90471 IMMUNIZATION ADMIN: CPT | Performed by: PHYSICIAN ASSISTANT

## 2020-06-05 PROCEDURE — 25010000003 LIDOCAINE 1 % SOLUTION: Performed by: PHYSICIAN ASSISTANT

## 2020-06-05 PROCEDURE — 90715 TDAP VACCINE 7 YRS/> IM: CPT | Performed by: PHYSICIAN ASSISTANT

## 2020-06-05 RX ORDER — LIDOCAINE HYDROCHLORIDE 10 MG/ML
10 INJECTION, SOLUTION INFILTRATION; PERINEURAL ONCE
Status: COMPLETED | OUTPATIENT
Start: 2020-06-05 | End: 2020-06-05

## 2020-06-05 RX ORDER — HYDROCODONE BITARTRATE AND ACETAMINOPHEN 5; 325 MG/1; MG/1
1 TABLET ORAL EVERY 6 HOURS PRN
Qty: 8 TABLET | Refills: 0 | Status: SHIPPED | OUTPATIENT
Start: 2020-06-05 | End: 2020-06-07

## 2020-06-05 RX ORDER — CEPHALEXIN 500 MG/1
500 CAPSULE ORAL 4 TIMES DAILY
Qty: 28 CAPSULE | Refills: 0 | Status: SHIPPED | OUTPATIENT
Start: 2020-06-05 | End: 2020-06-12

## 2020-06-05 RX ORDER — CEPHALEXIN 250 MG/1
500 CAPSULE ORAL ONCE
Status: COMPLETED | OUTPATIENT
Start: 2020-06-05 | End: 2020-06-05

## 2020-06-05 RX ORDER — LIDOCAINE AND PRILOCAINE 25; 25 MG/G; MG/G
1 CREAM TOPICAL ONCE
Status: COMPLETED | OUTPATIENT
Start: 2020-06-05 | End: 2020-06-05

## 2020-06-05 RX ORDER — HYDROCODONE BITARTRATE AND ACETAMINOPHEN 5; 325 MG/1; MG/1
1 TABLET ORAL ONCE
Status: COMPLETED | OUTPATIENT
Start: 2020-06-05 | End: 2020-06-05

## 2020-06-05 RX ADMIN — TETANUS TOXOID, REDUCED DIPHTHERIA TOXOID AND ACELLULAR PERTUSSIS VACCINE, ADSORBED 0.5 ML: 5; 2.5; 8; 8; 2.5 SUSPENSION INTRAMUSCULAR at 20:51

## 2020-06-05 RX ADMIN — HYDROCODONE BITARTRATE AND ACETAMINOPHEN 1 TABLET: 5; 325 TABLET ORAL at 22:22

## 2020-06-05 RX ADMIN — CEPHALEXIN 500 MG: 250 CAPSULE ORAL at 22:22

## 2020-06-05 RX ADMIN — LIDOCAINE HYDROCHLORIDE 10 ML: 10 INJECTION, SOLUTION INFILTRATION; PERINEURAL at 22:00

## 2020-06-05 RX ADMIN — LIDOCAINE AND PRILOCAINE 1 APPLICATION: 25; 25 CREAM TOPICAL at 20:50

## 2020-06-06 NOTE — ED PROVIDER NOTES
"Subjective   Patient is 64-year-old female with no significant past medical history presenting for evaluation of laceration.  Patient states around 7:30 PM she slipped on her neighbor steps and sustained a laceration to her right heel.  Patient states that she has been able to ambulate.  Has no pain over the foot or ankle.  She states the bleeding has been controlled with pressure.  She denies any anticoagulation use.  She states she can still flex and extend her ankle without difficulty.  She denies any other injury.  She is unsure when she had her last tetanus shot.          Review of Systems   Constitutional: Negative for chills and fever.   HENT: Negative.    Eyes: Negative.    Respiratory: Negative.    Cardiovascular: Negative.    Gastrointestinal: Negative.    Genitourinary: Negative.    Musculoskeletal: Negative for arthralgias and myalgias.   Skin: Positive for wound.   Allergic/Immunologic: Negative for immunocompromised state.   Neurological: Negative.    Psychiatric/Behavioral: Negative.        Past Medical History:   Diagnosis Date   • Onychomycosis        No Known Allergies    Past Surgical History:   Procedure Laterality Date   • HYSTERECTOMY         Family History   Problem Relation Age of Onset   • Other Sister         Brain tumor   • Thyroid disease Sister         Thyroid problems       Social History     Socioeconomic History   • Marital status:      Spouse name: Not on file   • Number of children: Not on file   • Years of education: Not on file   • Highest education level: Not on file   Tobacco Use   • Smoking status: Never Smoker   Substance and Sexual Activity   • Alcohol use: Yes     Comment: Social   • Drug use: No           Objective   Physical Exam   Nursing note and vitals reviewed.  /67 (BP Location: Right arm, Patient Position: Sitting)   Pulse 107   Temp 98.5 °F (36.9 °C) (Oral)   Resp 18   Ht 162.6 cm (64\")   Wt 47.6 kg (105 lb)   SpO2 98%   BMI 18.02 kg/m²     GEN: " "No acute distress, sitting upright in stretcher.  Awake and alert.  Does not appear toxic. She does appear anxious  Skin: Patient has a laceration at approximately 5 cm in shaped like a \"u\" on the back of her right heel.  No significant active bleeding. Foreign body   Head: Normocephalic, atraumatic  Eyes: EOM intact  Cardiovascular: Regular rate  Lungs: Clear to auscultation bilaterally  Extremities: Laceration to the back of the right heel.  Flexion and extension intact.  No tenderness over the midfoot, calf or knee.  Dorsalis pedis pulses 2+.  Neuro: GCS 15  Psych: Mood and affect are appropriate    Laceration Repair  Date/Time: 6/5/2020 10:04 PM  Performed by: Beatris Stephen PA-C  Authorized by: Shaq Mcmullen MD     Consent:     Consent obtained:  Verbal    Consent given by:  Patient    Risks discussed:  Infection, pain, poor cosmetic result, retained foreign body and need for additional repair    Alternatives discussed:  No treatment  Anesthesia (see MAR for exact dosages):     Anesthesia method:  Topical application and local infiltration    Topical anesthetic:  EMLA cream    Local anesthetic:  Lidocaine 1% w/o epi  Laceration details:     Location:  Leg    Leg location:  R lower leg    Length (cm):  7    Depth (mm):  2  Repair type:     Repair type:  Simple  Pre-procedure details:     Preparation:  Patient was prepped and draped in usual sterile fashion  Exploration:     Hemostasis achieved with:  Direct pressure    Wound exploration: wound explored through full range of motion and entire depth of wound probed and visualized      Wound extent: no foreign bodies/material noted and no tendon damage noted      Contaminated: no    Treatment:     Area cleansed with:  Saline and Hibiclens    Amount of cleaning:  Standard    Irrigation solution:  Sterile saline    Irrigation volume:  100 cc    Irrigation method:  Pressure wash    Visualized foreign bodies/material removed: no    Skin repair:     Repair " method:  Sutures    Suture size:  4-0    Suture material:  Nylon    Suture technique:  Simple interrupted    Number of sutures:  12  Approximation:     Approximation:  Close  Post-procedure details:     Dressing:  Open (no dressing)    Patient tolerance of procedure:  Tolerated well, no immediate complications               ED Course                                           MDM  Number of Diagnoses or Management Options  Laceration of right heel, initial encounter:   Diagnosis management comments: On Arrival, patient is stable.  She is anxious and mildly tachycardic on arrival.  She does not appear toxic.  Differential includes laceration, foreign body, skin avulsion.  Lower concern for any kind of underlying fracture.  Low concern for any tendon or ligament injury given patient's range of motion.  We will thoroughly clean the wound, update tetanus. Given location, will likely place sutures.    Dr Mcmullen evaluated wound at bedside with me.  Do not see any tendon or ligament injury or foreign body.  Wound was thoroughly repaired as per procedure note, patient was given antibiotics and pain medication as well as a walking boot for comfort.  Gave orthopedic surgery follow-up in case needed, can follow up with PCP.  Discussed wound care, follow-up and strict return precautions.  She verbalized understanding and was in agreement with this plan of care.       Amount and/or Complexity of Data Reviewed  Review and summarize past medical records: yes  Discuss the patient with other providers: yes    Risk of Complications, Morbidity, and/or Mortality  Presenting problems: low  Diagnostic procedures: low  Management options: moderate    Patient Progress  Patient progress: improved      Final diagnoses:   Laceration of right heel, initial encounter            Beatris Stephen PA-C  06/06/20 0015

## 2020-06-06 NOTE — DISCHARGE INSTRUCTIONS
Sutures will need to be in place for 7 to 10 days.  Keep wound clean with soap and water.  May apply expected to help prevent contamination.  Take antibiotic to help prevent infection.  Use pain medication as needed for severe pain.  Try to rest and elevate the wound to help with pain and swelling.  You will need to follow-up with your primary care provider next few days to reevaluate the wound.  They may be able to take your stitches out as well.  May schedule follow-up with orthopedic surgeon Dr. Cowan especially if you are having trouble with range of motion. Can wear walking boot for comfort.  Can return to the ER for any change, worsening symptoms, or any additional concerns including but not limited to severe worsening pain, inability to move the ankle, purulent drainage from the wound, fever greater than 100.4.

## 2020-06-15 ENCOUNTER — OFFICE VISIT (OUTPATIENT)
Dept: INTERNAL MEDICINE | Facility: CLINIC | Age: 65
End: 2020-06-15

## 2020-06-15 VITALS
HEART RATE: 83 BPM | OXYGEN SATURATION: 97 % | TEMPERATURE: 97.3 F | DIASTOLIC BLOOD PRESSURE: 60 MMHG | SYSTOLIC BLOOD PRESSURE: 124 MMHG

## 2020-06-15 DIAGNOSIS — L08.9 SKIN INFECTION: ICD-10-CM

## 2020-06-15 DIAGNOSIS — Z48.02 VISIT FOR SUTURE REMOVAL: Primary | ICD-10-CM

## 2020-06-15 PROCEDURE — 99213 OFFICE O/P EST LOW 20 MIN: CPT | Performed by: INTERNAL MEDICINE

## 2020-06-15 RX ORDER — DOXYCYCLINE HYCLATE 100 MG/1
100 CAPSULE ORAL 2 TIMES DAILY
Qty: 6 CAPSULE | Refills: 0 | Status: SHIPPED | OUTPATIENT
Start: 2020-06-15 | End: 2020-07-27

## 2020-06-15 NOTE — PROGRESS NOTES
Chief Complaint   Patient presents with   • Procedure     suture removal, 12 stitches in right heel     Subjective   Lyssa Neal is a 64 y.o. female.     Pt here for removal of sutures from her right heel.   She cut her heel coming down off a step.  She has completed all antibiotics given from emergency room as of today.  She does state that she used peroxide for several days over area of wound as well as Bactroban ointment that was given from emergency room.  She has had a small amount of drainage from wound, usually clear and thin drainage.  No fevers or chills.  There has been some redness around wound and her ankle or posterior foot does feel tight.  She does not feel sick.  She did have a Td in the ER.  She is having pain in her right arm today from 10 days ago when she had her tetanus shot.  She has had no redness or warmth of area though.    Lower Extremity Issue   Pertinent negatives include no chills or fever. The symptoms are aggravated by weight bearing.        The following portions of the patient's history were reviewed and updated as appropriate: allergies, current medications, past family history, past medical history, past social history, past surgical history and problem list.    Review of Systems   Constitutional: Negative for chills and fever.   Skin: Positive for color change and wound.       Objective   /60   Pulse 83   Temp 97.3 °F (36.3 °C)   SpO2 97%   There is no height or weight on file to calculate BMI.  Physical Exam   Constitutional: She is oriented to person, place, and time. She appears well-developed and well-nourished. No distress.   Very kind and pleasant female, appears younger than her stated age, she is in no obvious distress today and is wearing a mask   HENT:   Head: Normocephalic and atraumatic.   Right Ear: External ear normal.   Left Ear: External ear normal.   Eyes: Pupils are equal, round, and reactive to light. EOM are normal.   Neck: Normal range of motion.  Neck supple.   Pulmonary/Chest: Effort normal. No respiratory distress.   Neurological: She is alert and oriented to person, place, and time. No cranial nerve deficit.   Skin: There is erythema.   Right lower extremity: There is a C-shaped laceration over Achilles tendon area of right posterior heel.  There are 12 sutures in place.  Good approximation of wound.  There is some old scabs that are easily cleaned with good visualization of sutures.  Sutures were easily removed.  There is a scant amount of clear serous drainage from lateral edge of wound.  There is surrounding erythema and slight warmth of laceration.  Patient has slight pain with ambulation   Psychiatric: She has a normal mood and affect. Her behavior is normal. Judgment and thought content normal.   Nursing note and vitals reviewed.      Assessment/Plan   Lyssa Neal is here today and the following problems have been addressed:      Lyssa was seen today for procedure.    Diagnoses and all orders for this visit:    Visit for suture removal    Skin infection    Other orders  -     doxycycline (VIBRAMYCIN) 100 MG capsule; Take 1 capsule by mouth 2 (Two) Times a Day.    Avoid any further peroxide to wound, no need for Bactroban  She was given doxycycline 100 mg twice daily for 3 more days  Encouraged her to elevate lower extremity and avoid ambulation is much as possible to allow further wound healing at this time    Return to clinic if wound does not heal well over next several days or appears infected    Please note that portions of this note were completed with a voice recognition program.  Efforts were made to edit dictation, but occasionally words are mistranscribed.  Answers for HPI/ROS submitted by the patient on 6/13/2020   Lower extremity pain  What is the primary reason for your visit?: Lower Extremity Injury  Incident occurred: more than 1 week ago  Incident location: at home  Injury mechanism: a fall  Pain location: right heel  Pain  quality: aching  Pain - numeric: 2/10  Pain course: improving  loss of motion: Yes  Foreign body present: no foreign bodies

## 2020-07-27 ENCOUNTER — OFFICE VISIT (OUTPATIENT)
Dept: INTERNAL MEDICINE | Facility: CLINIC | Age: 65
End: 2020-07-27

## 2020-07-27 VITALS
TEMPERATURE: 97.5 F | BODY MASS INDEX: 17.24 KG/M2 | DIASTOLIC BLOOD PRESSURE: 56 MMHG | OXYGEN SATURATION: 100 % | HEIGHT: 64 IN | SYSTOLIC BLOOD PRESSURE: 145 MMHG | WEIGHT: 101 LBS | HEART RATE: 77 BPM

## 2020-07-27 DIAGNOSIS — R03.0 ELEVATED BLOOD PRESSURE READING: ICD-10-CM

## 2020-07-27 DIAGNOSIS — F32.1 MAJOR DEPRESSIVE DISORDER, SINGLE EPISODE, MODERATE (HCC): ICD-10-CM

## 2020-07-27 DIAGNOSIS — Z00.00 ENCOUNTER FOR MEDICARE ANNUAL WELLNESS EXAM: Primary | ICD-10-CM

## 2020-07-27 DIAGNOSIS — M19.90 ARTHRITIS: ICD-10-CM

## 2020-07-27 DIAGNOSIS — M81.0 AGE-RELATED OSTEOPOROSIS WITHOUT CURRENT PATHOLOGICAL FRACTURE: ICD-10-CM

## 2020-07-27 PROBLEM — Z48.02 VISIT FOR SUTURE REMOVAL: Status: RESOLVED | Noted: 2020-06-15 | Resolved: 2020-07-27

## 2020-07-27 PROBLEM — L08.9 SKIN INFECTION: Status: RESOLVED | Noted: 2020-06-15 | Resolved: 2020-07-27

## 2020-07-27 PROCEDURE — G0439 PPPS, SUBSEQ VISIT: HCPCS | Performed by: INTERNAL MEDICINE

## 2020-07-27 RX ORDER — CITALOPRAM 10 MG/1
10 TABLET ORAL DAILY
Qty: 30 TABLET | Refills: 1 | Status: SHIPPED | OUTPATIENT
Start: 2020-07-27 | End: 2021-09-01

## 2020-07-27 NOTE — PROGRESS NOTES
The ABCs of the Annual Wellness Visit  Subsequent Medicare Wellness Visit    Chief Complaint   Patient presents with   • Medicare Wellness-subsequent     wellness       Subjective   History of Present Illness:  Lyssa Neal is a 64 y.o. female who presents for a Subsequent Medicare Wellness Visit.  PMH of osteoporosis, DJD, depression and allergies.  BP is elevated today, she does not check it at home.  She denies any CP, SOA, palpitations or edema.  Last mammogram August 2019-she will schedule this with her GYN in [facility].  Last colonoscopy 2013, however Cologuard negative last year. She had a hysterectomy about 25 yrs ago and has been on estrace 2mg since then.   She is having a lot of stress right now.  Her sister has kidney cancer and her mother is getting older and this is hard on her.  Her Dad and sister help her mother primarily.  Her allergies are doing well right now.  She takes benadryl at night sometimes to help with sleep.  She does take her CBD oil for anxiety and it is helpful.     HEALTH RISK ASSESSMENT    Recent Hospitalizations:  No hospitalization(s) within the last year.    Current Medical Providers:  Patient Care Team:  Vermeesch, Marilyn K, MD as PCP - General  Gonzalo Rivas as PCP - Claims Attributed    Smoking Status:  Social History     Tobacco Use   Smoking Status Never Smoker       Alcohol Consumption:  Social History     Substance and Sexual Activity   Alcohol Use Yes    Comment: Social       Depression Screen:   PHQ-2/PHQ-9 Depression Screening 7/27/2020   Little interest or pleasure in doing things 1   Feeling down, depressed, or hopeless 2   Trouble falling or staying asleep, or sleeping too much 1   Feeling tired or having little energy 0   Poor appetite or overeating 0   Feeling bad about yourself - or that you are a failure or have let yourself or your family down 1   Trouble concentrating on things, such as reading the newspaper or watching television 0   Moving or  speaking so slowly that other people could have noticed. Or the opposite - being so fidgety or restless that you have been moving around a lot more than usual 0   Thoughts that you would be better off dead, or of hurting yourself in some way 0   Total Score 5   If you checked off any problems, how difficult have these problems made it for you to do your work, take care of things at home, or get along with other people? Not difficult at all       Fall Risk Screen:  BHAVIN Fall Risk Assessment was completed, and patient is at HIGH risk for falls. Assessment completed on:7/27/2020    Health Habits and Functional and Cognitive Screening:  Functional & Cognitive Status 7/27/2020   Do you have difficulty preparing food and eating? No   Do you have difficulty bathing yourself, getting dressed or grooming yourself? No   Do you have difficulty using the toilet? No   Do you have difficulty moving around from place to place? No   Do you have trouble with steps or getting out of a bed or a chair? No   Current Diet Well Balanced Diet   Dental Exam Up to date   Eye Exam Up to date   Exercise (times per week) 0 times per week   Current Exercise Activities Include -   Do you need help using the phone?  No   Are you deaf or do you have serious difficulty hearing?  No   Do you need help with transportation? No   Do you need help shopping? No   Do you need help preparing meals?  No   Do you need help with housework?  No   Do you need help with laundry? No   Do you need help taking your medications? No   Do you need help managing money? No   Do you ever drive or ride in a car without wearing a seat belt? No   Have you felt unusual stress, anger or loneliness in the last month? No   Who do you live with? Spouse   If you need help, do you have trouble finding someone available to you? No   Have you been bothered in the last four weeks by sexual problems? No   Do you have difficulty concentrating, remembering or making decisions? Yes          Does the patient have evidence of cognitive impairment? No    Asprin use counseling:Does not need ASA (and currently is not on it)    Age-appropriate Screening Schedule:  Refer to the list below for future screening recommendations based on patient's age, sex and/or medical conditions. Orders for these recommended tests are listed in the plan section. The patient has been provided with a written plan.    Health Maintenance   Topic Date Due   • DXA SCAN  08/24/2016   • MAMMOGRAM  10/05/2017   • INFLUENZA VACCINE  08/01/2020   • COLONOSCOPY  07/31/2023   • TDAP/TD VACCINES  Discontinued   • ZOSTER VACCINE  Discontinued          The following portions of the patient's history were reviewed and updated as appropriate: allergies, current medications, past family history, past medical history, past social history, past surgical history and problem list.    Outpatient Medications Prior to Visit   Medication Sig Dispense Refill   • buPROPion XL (WELLBUTRIN XL) 150 MG 24 hr tablet Take 1 tablet by mouth Every Morning. 90 tablet 1   • CBD (cannabidiol) oral oil Take  by mouth.     • estradiol (ESTRACE) 2 MG tablet Take 2 mg by mouth daily.     • fluticasone (FLONASE) 50 MCG/ACT nasal spray 2 sprays into the nostril(s) as directed by provider Daily. 2 puffs each nostril 1 bottle 0   • Multiple Vitamins-Minerals (CENTRUM SILVER ULTRA WOMENS PO) Take  by mouth daily.     • Probiotic Product (PROBIOTIC DAILY PO) Take  by mouth.     • doxycycline (VIBRAMYCIN) 100 MG capsule Take 1 capsule by mouth 2 (Two) Times a Day. 6 capsule 0   • mupirocin (BACTROBAN) 2 % ointment Apply  topically to the appropriate area as directed 2 (Two) Times a Day. 22 g 0     No facility-administered medications prior to visit.        Patient Active Problem List   Diagnosis   • Actinic keratosis   • Arthritis   • Osteoporosis   • Osteoarthritis of finger of left hand   • Major depressive disorder, single episode, moderate (CMS/HCC)   • Screening  "for colon cancer   • Encounter for Medicare annual wellness exam   • Elevated blood pressure reading       Advanced Care Planning:  ACP discussion was held with the patient during this visit. Patient does not have an advance directive, information provided.    Review of Systems   Constitutional: Negative.    HENT: Positive for tinnitus.    Eyes: Negative.    Respiratory: Negative.    Cardiovascular: Negative.    Gastrointestinal: Negative.    Endocrine: Positive for cold intolerance.   Genitourinary: Negative.    Musculoskeletal: Positive for arthralgias.   Skin: Negative.    Allergic/Immunologic: Positive for environmental allergies.   Neurological: Negative.    Hematological: Bruises/bleeds easily.   Psychiatric/Behavioral: The patient is nervous/anxious.        Compared to one year ago, the patient feels her physical health is the same.  Compared to one year ago, the patient feels her mental health is the same.    Reviewed chart for potential of high risk medication in the elderly: yes  Reviewed chart for potential of harmful drug interactions in the elderly:yes    Objective         Vitals:    07/27/20 1313   BP: 145/56   Pulse: 77   Temp: 97.5 °F (36.4 °C)   SpO2: 100%   Weight: 45.8 kg (101 lb)   Height: 162.6 cm (64\")   PainSc: 0-No pain       Body mass index is 17.34 kg/m².  Discussed the patient's BMI with her. The BMI is in the acceptable range.    Physical Exam   Constitutional: She is oriented to person, place, and time. She appears well-developed and well-nourished. No distress.   Very kind and pleasant female, appears her age, wearing a mask, NAD   HENT:   Head: Normocephalic and atraumatic.   Right Ear: External ear normal.   Left Ear: External ear normal.   TMs normal   Eyes: Pupils are equal, round, and reactive to light. Conjunctivae and EOM are normal.   Neck: Normal range of motion. Neck supple. No thyromegaly present.   Cardiovascular: Normal rate, regular rhythm, normal heart sounds and intact " distal pulses.   No murmur heard.  No carotid bruits   Pulmonary/Chest: Effort normal and breath sounds normal. No respiratory distress. She has no wheezes.   Abdominal: Soft. Bowel sounds are normal. She exhibits no distension. There is no tenderness.   Musculoskeletal: Normal range of motion. She exhibits no edema.   Lymphadenopathy:     She has no cervical adenopathy.   Neurological: She is alert and oriented to person, place, and time. No cranial nerve deficit. Coordination normal.   Psychiatric: Her behavior is normal. Judgment and thought content normal.   She has a flat affect today, she appears slightly depressed and anxious   Nursing note and vitals reviewed.            Assessment/Plan   Medicare Risks and Personalized Health Plan  CMS Preventative Services Quick Reference  Advance Directive Discussion  Breast Cancer/Mammogram Screening  Diabetic Lab Screening   Osteoprorosis Risk    The above risks/problems have been discussed with the patient.  Pertinent information has been shared with the patient in the After Visit Summary.  Follow up plans and orders are seen below in the Assessment/Plan Section.    Diagnoses and all orders for this visit:    1. Encounter for Medicare annual wellness exam (Primary)  -     CBC & Differential  -     Comprehensive Metabolic Panel  -     Vitamin D 25 Hydroxy    2. Age-related osteoporosis without current pathological fracture  -     Vitamin D 25 Hydroxy    3. Major depressive disorder, single episode, moderate (CMS/[facility])  -     citalopram (CeleXA) 10 MG tablet; Take 1 tablet by mouth Daily.  Dispense: 30 tablet; Refill: 1    4. Arthritis  -     CBC & Differential  -     Comprehensive Metabolic Panel    5. Elevated blood pressure reading  -     CBC & Differential  -     Comprehensive Metabolic Panel    Follow heart healthy/low salt diet  Avoid processed foods  Monitor blood pressure as discussed and bring reads to next OV  Exercise as tolerated up to 30 minutes 5 days per  week  Take all medications as prescribed  Continue wellbutrin and add celexa 10 mg daily for better control of anxiety and depression  Recommend she discuss lower dose of estrogen with her GYN  She will schedule a mammogram with her GYN  Recommend OTC voltaren gel twice a day prn for her hand DJD  Continue CBD prn for anxiety  She declines all vaccines at this time  Request a copy of living will when completed    Follow Up:  Return in about 6 weeks (around 9/7/2020) for Next scheduled follow up. for follow up of BP and depression and anxiety    An After Visit Summary and PPPS were given to the patient.

## 2020-07-28 LAB
25(OH)D3+25(OH)D2 SERPL-MCNC: 46.3 NG/ML (ref 30–100)
ALBUMIN SERPL-MCNC: 4.5 G/DL (ref 3.5–5.2)
ALBUMIN/GLOB SERPL: 1.7 G/DL
ALP SERPL-CCNC: 39 U/L (ref 39–117)
ALT SERPL-CCNC: 15 U/L (ref 1–33)
AST SERPL-CCNC: 23 U/L (ref 1–32)
BASOPHILS # BLD AUTO: 0.06 10*3/MM3 (ref 0–0.2)
BASOPHILS NFR BLD AUTO: 0.9 % (ref 0–1.5)
BILIRUB SERPL-MCNC: 0.2 MG/DL (ref 0–1.2)
BUN SERPL-MCNC: 10 MG/DL (ref 8–23)
BUN/CREAT SERPL: 16.1 (ref 7–25)
CALCIUM SERPL-MCNC: 8.9 MG/DL (ref 8.6–10.5)
CHLORIDE SERPL-SCNC: 101 MMOL/L (ref 98–107)
CO2 SERPL-SCNC: 25.2 MMOL/L (ref 22–29)
CREAT SERPL-MCNC: 0.62 MG/DL (ref 0.57–1)
EOSINOPHIL # BLD AUTO: 0.04 10*3/MM3 (ref 0–0.4)
EOSINOPHIL NFR BLD AUTO: 0.6 % (ref 0.3–6.2)
ERYTHROCYTE [DISTWIDTH] IN BLOOD BY AUTOMATED COUNT: 12.4 % (ref 12.3–15.4)
GLOBULIN SER CALC-MCNC: 2.6 GM/DL
GLUCOSE SERPL-MCNC: 85 MG/DL (ref 65–99)
HCT VFR BLD AUTO: 34.9 % (ref 34–46.6)
HGB BLD-MCNC: 12.1 G/DL (ref 12–15.9)
IMM GRANULOCYTES # BLD AUTO: 0.02 10*3/MM3 (ref 0–0.05)
IMM GRANULOCYTES NFR BLD AUTO: 0.3 % (ref 0–0.5)
LYMPHOCYTES # BLD AUTO: 1.44 10*3/MM3 (ref 0.7–3.1)
LYMPHOCYTES NFR BLD AUTO: 21.1 % (ref 19.6–45.3)
MCH RBC QN AUTO: 31.8 PG (ref 26.6–33)
MCHC RBC AUTO-ENTMCNC: 34.7 G/DL (ref 31.5–35.7)
MCV RBC AUTO: 91.6 FL (ref 79–97)
MONOCYTES # BLD AUTO: 0.66 10*3/MM3 (ref 0.1–0.9)
MONOCYTES NFR BLD AUTO: 9.6 % (ref 5–12)
NEUTROPHILS # BLD AUTO: 4.62 10*3/MM3 (ref 1.7–7)
NEUTROPHILS NFR BLD AUTO: 67.5 % (ref 42.7–76)
NRBC BLD AUTO-RTO: 0 /100 WBC (ref 0–0.2)
PLATELET # BLD AUTO: 291 10*3/MM3 (ref 140–450)
POTASSIUM SERPL-SCNC: 5.2 MMOL/L (ref 3.5–5.2)
PROT SERPL-MCNC: 7.1 G/DL (ref 6–8.5)
RBC # BLD AUTO: 3.81 10*6/MM3 (ref 3.77–5.28)
SODIUM SERPL-SCNC: 134 MMOL/L (ref 136–145)
WBC # BLD AUTO: 6.84 10*3/MM3 (ref 3.4–10.8)

## 2020-11-13 RX ORDER — BUPROPION HYDROCHLORIDE 150 MG/1
TABLET ORAL
Qty: 90 TABLET | Refills: 3 | Status: SHIPPED | OUTPATIENT
Start: 2020-11-13 | End: 2021-07-27 | Stop reason: SDUPTHER

## 2021-07-27 ENCOUNTER — TELEPHONE (OUTPATIENT)
Dept: INTERNAL MEDICINE | Facility: CLINIC | Age: 66
End: 2021-07-27

## 2021-07-27 RX ORDER — BUPROPION HYDROCHLORIDE 150 MG/1
150 TABLET ORAL EVERY MORNING
Qty: 5 TABLET | Refills: 0 | Status: SHIPPED | OUTPATIENT
Start: 2021-07-27 | End: 2021-09-01

## 2021-07-27 NOTE — TELEPHONE ENCOUNTER
Patient is currently in [facility] as her sister is being admitted into the hospital and she is gonna have to stay. She did not bring her bupropion with her and wants to know if she is ok to not take this while she is gone. I asked if she knew of any pharmacies around her and she states she wants to see if she is ok with not taking this first and if not then she will do research into pharmacies after she speaks with nurse.

## 2021-07-27 NOTE — TELEPHONE ENCOUNTER
Advised Pt she can have with draws if she goes without medication. Sent 5 tablets to [facility] in [place] for Pt.

## 2021-08-17 ENCOUNTER — OFFICE VISIT (OUTPATIENT)
Dept: INTERNAL MEDICINE | Facility: CLINIC | Age: 66
End: 2021-08-17

## 2021-08-17 VITALS
OXYGEN SATURATION: 97 % | SYSTOLIC BLOOD PRESSURE: 120 MMHG | HEART RATE: 78 BPM | RESPIRATION RATE: 14 BRPM | BODY MASS INDEX: 16.39 KG/M2 | TEMPERATURE: 97.5 F | WEIGHT: 96 LBS | HEIGHT: 64 IN | DIASTOLIC BLOOD PRESSURE: 74 MMHG

## 2021-08-17 DIAGNOSIS — F41.9 ANXIETY AND DEPRESSION: Primary | ICD-10-CM

## 2021-08-17 DIAGNOSIS — F32.A ANXIETY AND DEPRESSION: Primary | ICD-10-CM

## 2021-08-17 PROCEDURE — 99213 OFFICE O/P EST LOW 20 MIN: CPT | Performed by: INTERNAL MEDICINE

## 2021-08-17 NOTE — PROGRESS NOTES
Chief Complaint   Patient presents with   • Hypertension   • Anxiety     Subjective   Lyssa Neal is a 66 y.o. female.     Here today for follow-up of anxiety.  Patient is currently on Wellbutrin  mg  daily as well as CBD oil as needed to help with underlying depression and anxiety symptoms.  She was provided with Celexa 10 mg daily in the past but never took this medication, she believes that she picked it up and still has it at home but never used it for her anxiety and depression issues.  She has been very stressed this yr.  Very anxious.  Her mother  in May.  Her sister's cancer is worsening.  Her son is getting  in October.   She has been having some palpitations lately.  Last night she got up to go to restroom and felt her heart pulsing in head.  She denies any CP or SOA, but when she is anxious she feels like she just ran for a while.  She had some tingling in left foot and leg today while anxious.    She got a BP cuff and it is running 120s-140/60-70s.        The following portions of the patient's history were reviewed and updated as appropriate: allergies, current medications, past family history, past medical history, past social history, past surgical history and problem list.    Review of Systems   Constitutional: Negative for activity change, appetite change and unexpected weight change.   Eyes: Negative for visual disturbance.   Respiratory: Negative for shortness of breath and wheezing.    Cardiovascular: Positive for palpitations. Negative for chest pain and leg swelling.   Gastrointestinal: Negative for abdominal pain.   Genitourinary: Negative for hematuria.   Musculoskeletal: Negative for back pain.   Neurological: Negative for headaches.        Tingling in left lower extremity   Psychiatric/Behavioral: Positive for sleep disturbance. Negative for dysphoric mood. The patient is nervous/anxious.        Objective   /74   Pulse 78   Temp 97.5 °F (36.4 °C)   Resp 14    "Ht 162.6 cm (64.02\")   Wt 43.5 kg (96 lb)   SpO2 97%   BMI 16.47 kg/m²   Body mass index is 16.47 kg/m².  Physical Exam  Vitals and nursing note reviewed.   Constitutional:       General: She is not in acute distress.     Appearance: Normal appearance. She is not ill-appearing.      Comments: Very kind and pleasant female, appears her age and is in no distress today   HENT:      Head: Normocephalic and atraumatic.      Right Ear: External ear normal.      Left Ear: External ear normal.   Eyes:      General:         Right eye: No discharge.         Left eye: No discharge.      Extraocular Movements: Extraocular movements intact.   Pulmonary:      Effort: Pulmonary effort is normal. No respiratory distress.   Neurological:      General: No focal deficit present.      Mental Status: She is alert and oriented to person, place, and time. Mental status is at baseline.      Cranial Nerves: No cranial nerve deficit.      Motor: No weakness.      Gait: Gait normal.   Psychiatric:         Behavior: Behavior normal.         Thought Content: Thought content normal.         Judgment: Judgment normal.      Comments: Patient appears anxious today throughout our interview         Assessment/Plan   Lyssa Neal is here today and the following problems have been addressed:      Diagnoses and all orders for this visit:    1. Anxiety and depression (Primary)    Lengthy discussion today regarding home life stressors over past 1 year  She is exhibiting symptoms of both anxiety and depression today  Continue Wellbutrin as this medicine has been helpful for her for many years, encouraged her to add Celexa 10 mg daily as I feel this will help her overlying anxiety issues  Encouraged her to take Celexa in evening  May continue CBD oil also if needed  We will discuss counseling on next office visit and adjust Celexa dose if necessary    Return to clinic in a few weeks as scheduled for Medicare wellness exam with complete labs    Please " note that portions of this note were completed with a voice recognition program.  Efforts were made to edit dictation, but occasionally words are mistranscribed.  Answers for HPI/ROS submitted by the patient on 8/17/2021  What is the primary reason for your visit?: High Blood Pressure

## 2021-09-01 ENCOUNTER — OFFICE VISIT (OUTPATIENT)
Dept: INTERNAL MEDICINE | Facility: CLINIC | Age: 66
End: 2021-09-01

## 2021-09-01 VITALS
SYSTOLIC BLOOD PRESSURE: 120 MMHG | BODY MASS INDEX: 16.22 KG/M2 | TEMPERATURE: 97 F | OXYGEN SATURATION: 98 % | WEIGHT: 95 LBS | HEIGHT: 64 IN | DIASTOLIC BLOOD PRESSURE: 62 MMHG | HEART RATE: 83 BPM

## 2021-09-01 DIAGNOSIS — M81.0 AGE-RELATED OSTEOPOROSIS WITHOUT CURRENT PATHOLOGICAL FRACTURE: ICD-10-CM

## 2021-09-01 DIAGNOSIS — F32.A ANXIETY AND DEPRESSION: ICD-10-CM

## 2021-09-01 DIAGNOSIS — Z11.59 NEED FOR HEPATITIS C SCREENING TEST: ICD-10-CM

## 2021-09-01 DIAGNOSIS — F41.9 ANXIETY AND DEPRESSION: ICD-10-CM

## 2021-09-01 DIAGNOSIS — Z00.00 ENCOUNTER FOR MEDICARE ANNUAL WELLNESS EXAM: Primary | ICD-10-CM

## 2021-09-01 DIAGNOSIS — M19.90 ARTHRITIS: ICD-10-CM

## 2021-09-01 PROBLEM — R03.0 ELEVATED BLOOD PRESSURE READING: Status: RESOLVED | Noted: 2020-07-27 | Resolved: 2021-09-01

## 2021-09-01 PROCEDURE — G0439 PPPS, SUBSEQ VISIT: HCPCS | Performed by: INTERNAL MEDICINE

## 2021-09-01 RX ORDER — DULOXETIN HYDROCHLORIDE 20 MG/1
20 CAPSULE, DELAYED RELEASE ORAL DAILY
Qty: 30 CAPSULE | Refills: 1 | Status: SHIPPED | OUTPATIENT
Start: 2021-09-01 | End: 2021-09-20

## 2021-09-01 NOTE — PROGRESS NOTES
The ABCs of the Annual Wellness Visit  Subsequent Medicare Wellness Visit    Chief Complaint   Patient presents with   • Medicare Wellness-subsequent     Pt states she recently lost her sister. Would like to discuss depression/anxiety medications.      Subjective { Optional Links CC  Problem List  Visit Diagnosis  SnapShot  Encounters  Notes  Medications  Labs  Result Review Imaging  Media :23}   History of Present Illness:  Lyssa Neal is a 66 y.o. female who presents for a Subsequent Medicare Wellness Visit.  PMH of anxiety and depression, DJD, OP and AK.  Last mammogram 2021, cologuard 2019, scope due in 2023.  She only drinks juice and eats little for breakfast, eats a sandwich for lunch and a good dinner.  She is not complaint with her calcium and vit d daily.  She does take the wellbutrin daily but would like to wean off of med.  She feels anxious but not depressed.  She is sleeping well at night most of the time.  She is still taking CBD on occasion.  She uses voltaren gel for DJD in hands.      The following portions of the patient's history were reviewed and   updated as appropriate: allergies, current medications, past family history, past medical history, past social history, past surgical history and problem list.     Compared to one year ago, the patient feels her physical   health is the same.    Compared to one year ago, the patient feels her mental   health is worse.    Recent Hospitalizations:  She was not admitted to the hospital during the last year.       Current Medical Providers:  Patient Care Team:  Vermeesch, Marilyn K, MD as PCP - General    Outpatient Medications Prior to Visit   Medication Sig Dispense Refill   • CBD (cannabidiol) oral oil Take  by mouth.     • estradiol (ESTRACE) 2 MG tablet Take 2 mg by mouth daily.     • fluticasone (FLONASE) 50 MCG/ACT nasal spray 2 sprays into the nostril(s) as directed by provider Daily. 2 puffs each nostril 1 bottle 0   • Multiple  "Vitamins-Minerals (CENTRUM SILVER ULTRA WOMENS PO) Take  by mouth daily.     • Probiotic Product (PROBIOTIC DAILY PO) Take  by mouth.     • buPROPion XL (WELLBUTRIN XL) 150 MG 24 hr tablet Take 1 tablet by mouth Every Morning. 5 tablet 0   • citalopram (CeleXA) 10 MG tablet Take 1 tablet by mouth Daily. 30 tablet 1     No facility-administered medications prior to visit.       No opioid medication identified on active medication list. I have reviewed chart for other potential  high risk medication/s and harmful drug interactions in the elderly.          Aspirin is not on active medication list.  Aspirin use is not indicated based on review of current medical condition/s. Risk of harm outweighs potential benefits.  .    Patient Active Problem List   Diagnosis   • Actinic keratosis   • Arthritis   • Osteoporosis   • Osteoarthritis of finger of left hand   • Screening for colon cancer   • Encounter for Medicare annual wellness exam   • Anxiety and depression   • Need for hepatitis C screening test     Advance Care Planning   Advance Directive is not on file.  ACP discussion was held with the patient during this visit. Patient does not have an advance directive, information provided.    Review of Systems   Constitutional: Negative.    HENT: Positive for tinnitus.    Eyes: Negative.    Respiratory: Negative.    Cardiovascular: Negative.    Gastrointestinal: Negative.    Endocrine: Negative.    Genitourinary: Negative.    Musculoskeletal: Negative.    Skin: Negative.    Allergic/Immunologic: Positive for environmental allergies.   Neurological: Negative.    Hematological: Bruises/bleeds easily.   Psychiatric/Behavioral: Positive for dysphoric mood. The patient is nervous/anxious.         Objective       Vitals:    09/01/21 1003   BP: 120/62   Pulse: 83   Temp: 97 °F (36.1 °C)   SpO2: 98%   Weight: 43.1 kg (95 lb)   Height: 162.6 cm (64.02\")   PainSc: 0-No pain     BMI Readings from Last 1 Encounters:   09/01/21 16.30 " kg/m²   BMI is below normal parameters. Recommendations include: treating the underlying disease process    Does the patient have evidence of cognitive impairment? No    Physical Exam  Vitals and nursing note reviewed.   Constitutional:       General: She is not in acute distress.     Appearance: Normal appearance. She is well-developed. She is not ill-appearing.      Comments: Kind and pleasant female, appears stated age and in NAD today, she is quite thin in appearance   HENT:      Head: Normocephalic and atraumatic.      Right Ear: Tympanic membrane, ear canal and external ear normal.      Left Ear: Tympanic membrane, ear canal and external ear normal.   Eyes:      General:         Right eye: No discharge.         Left eye: No discharge.      Extraocular Movements: Extraocular movements intact.      Conjunctiva/sclera: Conjunctivae normal.      Pupils: Pupils are equal, round, and reactive to light.   Neck:      Thyroid: No thyromegaly.      Vascular: No carotid bruit.      Comments: No thyromegaly or mass  Cardiovascular:      Rate and Rhythm: Normal rate and regular rhythm.      Pulses: Normal pulses.      Heart sounds: Normal heart sounds. No murmur heard.     Pulmonary:      Effort: Pulmonary effort is normal. No respiratory distress.      Breath sounds: Normal breath sounds. No wheezing.   Abdominal:      General: Bowel sounds are normal. There is no distension.      Palpations: Abdomen is soft.      Tenderness: There is no abdominal tenderness.   Musculoskeletal:         General: Normal range of motion.      Cervical back: Normal range of motion and neck supple.      Right lower leg: No edema.      Left lower leg: No edema.   Lymphadenopathy:      Cervical: No cervical adenopathy.   Skin:     General: Skin is warm.      Findings: No rash.   Neurological:      General: No focal deficit present.      Mental Status: She is alert and oriented to person, place, and time. Mental status is at baseline.       Cranial Nerves: No cranial nerve deficit.      Motor: No weakness.      Coordination: Coordination normal.      Gait: Gait normal.   Psychiatric:         Mood and Affect: Mood normal.         Behavior: Behavior normal.         Thought Content: Thought content normal.         Judgment: Judgment normal.      Comments: Patient appears somewhat depressed throughout our interview today                 HEALTH RISK ASSESSMENT    Smoking Status:  Social History     Tobacco Use   Smoking Status Never Smoker     Alcohol Consumption:  Social History     Substance and Sexual Activity   Alcohol Use Yes   • Alcohol/week: 0.0 standard drinks    Comment: Social     Fall Risk Screen:    STEADI Fall Risk Assessment was completed, and patient is at LOW risk for falls.Assessment completed on:9/1/2021    Depression Screening:  PHQ-2/PHQ-9 Depression Screening 9/1/2021   Little interest or pleasure in doing things 0   Feeling down, depressed, or hopeless 1   Trouble falling or staying asleep, or sleeping too much -   Feeling tired or having little energy -   Poor appetite or overeating -   Feeling bad about yourself - or that you are a failure or have let yourself or your family down -   Trouble concentrating on things, such as reading the newspaper or watching television -   Moving or speaking so slowly that other people could have noticed. Or the opposite - being so fidgety or restless that you have been moving around a lot more than usual -   Thoughts that you would be better off dead, or of hurting yourself in some way -   Total Score 1   If you checked off any problems, how difficult have these problems made it for you to do your work, take care of things at home, or get along with other people? -       Health Habits and Functional and Cognitive Screening:  Functional & Cognitive Status 9/1/2021   Do you have difficulty preparing food and eating? No   Do you have difficulty bathing yourself, getting dressed or grooming yourself? No    Do you have difficulty using the toilet? No   Do you have difficulty moving around from place to place? No   Do you have trouble with steps or getting out of a bed or a chair? No   Current Diet Well Balanced Diet   Dental Exam Up to date   Eye Exam Up to date   Exercise (times per week) 3 times per week   Current Exercise Activities Include -   Do you need help using the phone?  No   Are you deaf or do you have serious difficulty hearing?  No   Do you need help with transportation? No   Do you need help shopping? No   Do you need help preparing meals?  No   Do you need help with housework?  No   Do you need help with laundry? No   Do you need help taking your medications? No   Do you need help managing money? No   Do you ever drive or ride in a car without wearing a seat belt? No   Have you felt unusual stress, anger or loneliness in the last month? Yes   Who do you live with? Spouse   If you need help, do you have trouble finding someone available to you? No   Have you been bothered in the last four weeks by sexual problems? No   Do you have difficulty concentrating, remembering or making decisions? No       Age-appropriate Screening Schedule:  Refer to the list below for future screening recommendations based on patient's age, sex and/or medical conditions. Orders for these recommended tests are listed in the plan section. The patient has been provided with a written plan.    Health Maintenance   Topic Date Due   • MAMMOGRAM  08/15/2021   • DXA SCAN  08/15/2021   • INFLUENZA VACCINE  10/01/2021   • PAP SMEAR  Discontinued   • TDAP/TD VACCINES  Discontinued   • ZOSTER VACCINE  Discontinued              Assessment/Plan     CMS Preventative Services Quick Reference  Risk Factors Identified During Encounter  Depression/Dysphoria  Immunizations Discussed/Encouraged (specific Immunizations; Pneumococcal 23 and Shingrix  The above risks/problems have been discussed with the patient.  Follow up actions/plans if  indicated are seen below in the Assessment/Plan Section.  Pertinent information has been shared with the patient in the After Visit Summary.    Diagnoses and all orders for this visit:    1. Encounter for Medicare annual wellness exam (Primary)  -     CBC & Differential  -     Comprehensive Metabolic Panel  -     Hepatitis C Antibody  -     Lipid Panel With / Chol / HDL Ratio  -     TSH  -     Vitamin D 25 Hydroxy    2. Anxiety and depression  -     TSH    3. Arthritis    4. Age-related osteoporosis without current pathological fracture  -     Vitamin D 25 Hydroxy    5. Need for hepatitis C screening test  -     Hepatitis C Antibody    Other orders  -     DULoxetine (CYMBALTA) 20 MG capsule; Take 1 capsule by mouth Daily.  Dispense: 30 capsule; Refill: 1    Labs as noted  We were going to give Pneumovax 23 today however patient left clinic before shot was given, will give vaccine on next office visit in 6 weeks  Recommend shingles vaccine at local pharmacy  We will discontinue Wellbutrin due to worsening anxiety and change patient to duloxetine.  Start duloxetine 20 mg daily this evening and in 10 days discontinue Wellbutrin XL and continue duloxetine only  Encourage daily calcium and vitamin D supplements, patient admits she has not been compliant with this  Encourage regular weightbearing exercise 5 days a week to help with underlying osteoporosis  Encourage patient to eat 3 meals a day with an occasional boost or Ensure to help with her current weight, would like to see BMI of 20 or a weight of 110  Continue CBD or or Tylenol as needed for osteoarthritis pain    Follow Up:   Return in about 6 weeks (around 10/13/2021) for Next scheduled follow up.  For follow-up of depression and anxiety and need for Pneumovax 23    An After Visit Summary and PPPS were given to the patient.

## 2021-09-02 LAB
25(OH)D3+25(OH)D2 SERPL-MCNC: 45 NG/ML (ref 30–100)
ALBUMIN SERPL-MCNC: 4.5 G/DL (ref 3.5–5.2)
ALBUMIN/GLOB SERPL: 2 G/DL
ALP SERPL-CCNC: 41 U/L (ref 39–117)
ALT SERPL-CCNC: 19 U/L (ref 1–33)
AST SERPL-CCNC: 25 U/L (ref 1–32)
BASOPHILS # BLD AUTO: 0.04 10*3/MM3 (ref 0–0.2)
BASOPHILS NFR BLD AUTO: 0.9 % (ref 0–1.5)
BILIRUB SERPL-MCNC: 0.2 MG/DL (ref 0–1.2)
BUN SERPL-MCNC: 8 MG/DL (ref 8–23)
BUN/CREAT SERPL: 13.1 (ref 7–25)
CALCIUM SERPL-MCNC: 9 MG/DL (ref 8.6–10.5)
CHLORIDE SERPL-SCNC: 103 MMOL/L (ref 98–107)
CHOLEST SERPL-MCNC: 165 MG/DL (ref 0–200)
CHOLEST/HDLC SERPL: 2.29 {RATIO}
CO2 SERPL-SCNC: 25.5 MMOL/L (ref 22–29)
CREAT SERPL-MCNC: 0.61 MG/DL (ref 0.57–1)
EOSINOPHIL # BLD AUTO: 0.04 10*3/MM3 (ref 0–0.4)
EOSINOPHIL NFR BLD AUTO: 0.9 % (ref 0.3–6.2)
ERYTHROCYTE [DISTWIDTH] IN BLOOD BY AUTOMATED COUNT: 11.8 % (ref 12.3–15.4)
GLOBULIN SER CALC-MCNC: 2.3 GM/DL
GLUCOSE SERPL-MCNC: 82 MG/DL (ref 65–99)
HCT VFR BLD AUTO: 37.8 % (ref 34–46.6)
HCV AB S/CO SERPL IA: <0.1 S/CO RATIO (ref 0–0.9)
HDLC SERPL-MCNC: 72 MG/DL (ref 40–60)
HGB BLD-MCNC: 12.8 G/DL (ref 12–15.9)
IMM GRANULOCYTES # BLD AUTO: 0.01 10*3/MM3 (ref 0–0.05)
IMM GRANULOCYTES NFR BLD AUTO: 0.2 % (ref 0–0.5)
LDLC SERPL CALC-MCNC: 81 MG/DL (ref 0–100)
LYMPHOCYTES # BLD AUTO: 1.14 10*3/MM3 (ref 0.7–3.1)
LYMPHOCYTES NFR BLD AUTO: 26.3 % (ref 19.6–45.3)
MCH RBC QN AUTO: 30.5 PG (ref 26.6–33)
MCHC RBC AUTO-ENTMCNC: 33.9 G/DL (ref 31.5–35.7)
MCV RBC AUTO: 90.2 FL (ref 79–97)
MONOCYTES # BLD AUTO: 0.52 10*3/MM3 (ref 0.1–0.9)
MONOCYTES NFR BLD AUTO: 12 % (ref 5–12)
NEUTROPHILS # BLD AUTO: 2.58 10*3/MM3 (ref 1.7–7)
NEUTROPHILS NFR BLD AUTO: 59.7 % (ref 42.7–76)
NRBC BLD AUTO-RTO: 0 /100 WBC (ref 0–0.2)
PLATELET # BLD AUTO: 272 10*3/MM3 (ref 140–450)
POTASSIUM SERPL-SCNC: 4.5 MMOL/L (ref 3.5–5.2)
PROT SERPL-MCNC: 6.8 G/DL (ref 6–8.5)
RBC # BLD AUTO: 4.19 10*6/MM3 (ref 3.77–5.28)
SODIUM SERPL-SCNC: 137 MMOL/L (ref 136–145)
TRIGL SERPL-MCNC: 63 MG/DL (ref 0–150)
TSH SERPL DL<=0.005 MIU/L-ACNC: 3.38 UIU/ML (ref 0.27–4.2)
VLDLC SERPL CALC-MCNC: 12 MG/DL (ref 5–40)
WBC # BLD AUTO: 4.33 10*3/MM3 (ref 3.4–10.8)

## 2021-09-20 RX ORDER — ESCITALOPRAM OXALATE 10 MG/1
10 TABLET ORAL DAILY
Qty: 30 TABLET | Refills: 1 | Status: SHIPPED | OUTPATIENT
Start: 2021-09-20 | End: 2021-10-01 | Stop reason: SDUPTHER

## 2021-09-20 RX ORDER — BUSPIRONE HYDROCHLORIDE 10 MG/1
10 TABLET ORAL 3 TIMES DAILY PRN
Qty: 60 TABLET | Refills: 3 | Status: SHIPPED | OUTPATIENT
Start: 2021-09-20 | End: 2022-09-02 | Stop reason: SDUPTHER

## 2021-10-01 ENCOUNTER — PATIENT MESSAGE (OUTPATIENT)
Dept: INTERNAL MEDICINE | Facility: CLINIC | Age: 66
End: 2021-10-01

## 2021-10-01 RX ORDER — ESCITALOPRAM OXALATE 5 MG/1
5 TABLET ORAL DAILY
Qty: 30 TABLET | Refills: 3 | Status: SHIPPED | OUTPATIENT
Start: 2021-10-01 | End: 2022-09-02

## 2021-10-01 NOTE — TELEPHONE ENCOUNTER
Please send me a prescription for Lexapro 5 mg daily #30 with 3 refills and the ciclopirox to use once daily or as directed on nail and I will sign for it.  Thank you

## 2021-10-13 ENCOUNTER — OFFICE VISIT (OUTPATIENT)
Dept: INTERNAL MEDICINE | Facility: CLINIC | Age: 66
End: 2021-10-13

## 2021-10-13 VITALS
SYSTOLIC BLOOD PRESSURE: 124 MMHG | OXYGEN SATURATION: 99 % | WEIGHT: 94 LBS | HEART RATE: 83 BPM | HEIGHT: 64 IN | TEMPERATURE: 97.1 F | DIASTOLIC BLOOD PRESSURE: 70 MMHG | BODY MASS INDEX: 16.05 KG/M2

## 2021-10-13 DIAGNOSIS — F41.9 ANXIETY AND DEPRESSION: Primary | ICD-10-CM

## 2021-10-13 DIAGNOSIS — F32.A ANXIETY AND DEPRESSION: Primary | ICD-10-CM

## 2021-10-13 PROBLEM — Z11.59 NEED FOR HEPATITIS C SCREENING TEST: Status: RESOLVED | Noted: 2021-09-01 | Resolved: 2021-10-13

## 2021-10-13 PROCEDURE — 99213 OFFICE O/P EST LOW 20 MIN: CPT | Performed by: INTERNAL MEDICINE

## 2021-10-13 PROCEDURE — 90732 PPSV23 VACC 2 YRS+ SUBQ/IM: CPT | Performed by: INTERNAL MEDICINE

## 2021-10-13 PROCEDURE — G0009 ADMIN PNEUMOCOCCAL VACCINE: HCPCS | Performed by: INTERNAL MEDICINE

## 2021-10-13 NOTE — PROGRESS NOTES
Chief Complaint   Patient presents with   • Follow-up     for depression and anxiety. Pt states she didn't start Lexapro 5 mg until this past weekend      Subjective   Lyssa Neal is a 66 y.o. female.     Patient was seen 6 weeks ago for her Medicare wellness visit at which time we discussed her anxiety and depression issues.  We started low-dose duloxetine 20 mg daily and started to wean off of Wellbutrin per her request.  She did not tolerate duloxetine due to side effects and contacted me.  We changed to Lexapro 10 mg daily.  She again contacted me stating she was too tired with Lexapro 10 mg so this dose was decreased to 5 mg approximately 2 weeks ago.  Apparently patient did not start lower dose of Lexapro until a few days ago.  She is taking in the AM despite telling her to take it at night.  She is having a few loose stools, she thinks is a side of effect of med and not due to anxiety.  She is sleeping well overall.    She uses her CBD oil also.  She has never taken the buspar for her anxiety.    She does not feel depressed right now.  Most of her depression was due to situations at home that are primarily resolved.  Her anxiety comes and goes and is also situational.  She is anxious to stop all antidepressants although she states this is her long-term goal.  She actually states that she has felt better since off of antidepressants, however she is afraid to be off medication as she has been on antidepressants since 2013.  Her mood has slowly improved since she is getting over the loss of her sister.  Her son's wedding is now completed and she no longer has to worry about this.  She will be leaving for [place] soon and this will be relaxing for her.  She will be anxious about Thanksgiving at her home but is willing to try BuSpar and will continue her CBD oil.  She is willing to try BuSpar only as needed for anxiety at this time.  We discussed option of daily Lexapro for anxiety if she does not feel that  "she can tolerate as needed BuSpar alone.  I recommend she take Lexapro at night though if needed because it is causing her fatigue.       The following portions of the patient's history were reviewed and updated as appropriate: allergies, current medications, past family history, past medical history, past social history, past surgical history and problem list.    Review of Systems   Constitutional: Negative for activity change, appetite change and unexpected weight change.   Psychiatric/Behavioral: Negative for dysphoric mood and sleep disturbance. The patient is nervous/anxious.        Objective   /70   Pulse 83   Temp 97.1 °F (36.2 °C)   Ht 162.6 cm (64.02\")   Wt 42.6 kg (94 lb)   SpO2 99%   BMI 16.12 kg/m²   Body mass index is 16.12 kg/m².  Physical Exam  Vitals and nursing note reviewed.   Constitutional:       Appearance: Normal appearance.      Comments: Kind and pleasant female, appears her age and in no distress today   HENT:      Head: Normocephalic and atraumatic.      Right Ear: External ear normal.      Left Ear: External ear normal.   Eyes:      General:         Right eye: No discharge.         Left eye: No discharge.      Extraocular Movements: Extraocular movements intact.   Pulmonary:      Effort: Pulmonary effort is normal. No respiratory distress.   Neurological:      General: No focal deficit present.      Mental Status: She is alert and oriented to person, place, and time. Mental status is at baseline.      Cranial Nerves: No cranial nerve deficit.   Psychiatric:         Mood and Affect: Mood normal.         Behavior: Behavior normal.         Thought Content: Thought content normal.         Judgment: Judgment normal.      Comments: Patient appears slightly anxious today throughout our visit         Assessment/Plan   Lyssa Neal is here today and the following problems have been addressed:      Diagnoses and all orders for this visit:    1. Anxiety and depression " (Primary)    Other orders  -     Pneumococcal Polysaccharide Vaccine 23-Valent (PPSV23) Greater Than or Equal To 1yo Subcutaneous / IM    Encourage patient to try BuSpar, 5 to 10 mg up to 3 times daily as needed for anxiety symptoms  If she does not feel her anxiety is well controlled, or if she begins to develop symptoms of depression she may resume Lexapro at 5 mg every evening as discussed  Encouraged her to contact me if she needs to resume Lexapro or if she is having side effects, or if we need to increase dose of Lexapro  Continue CBD oil  Pneumovax 23 provided today    Return to clinic in 3 months for follow-up of anxiety and depression, or as needed  Please note that portions of this note were completed with a voice recognition program.  Efforts were made to edit dictation, but occasionally words are mistranscribed.

## 2021-12-07 ENCOUNTER — OFFICE VISIT (OUTPATIENT)
Dept: INTERNAL MEDICINE | Facility: CLINIC | Age: 66
End: 2021-12-07

## 2021-12-07 VITALS
DIASTOLIC BLOOD PRESSURE: 82 MMHG | HEIGHT: 64 IN | SYSTOLIC BLOOD PRESSURE: 122 MMHG | BODY MASS INDEX: 16.46 KG/M2 | TEMPERATURE: 97.7 F | OXYGEN SATURATION: 98 % | HEART RATE: 88 BPM | WEIGHT: 96.4 LBS

## 2021-12-07 DIAGNOSIS — Z20.822 SUSPECTED COVID-19 VIRUS INFECTION: ICD-10-CM

## 2021-12-07 DIAGNOSIS — J06.9 ACUTE URI: ICD-10-CM

## 2021-12-07 DIAGNOSIS — R21 RASH: ICD-10-CM

## 2021-12-07 DIAGNOSIS — R09.89 CHEST CONGESTION: Primary | ICD-10-CM

## 2021-12-07 PROCEDURE — U0005 INFEC AGEN DETEC AMPLI PROBE: HCPCS | Performed by: NURSE PRACTITIONER

## 2021-12-07 PROCEDURE — U0004 COV-19 TEST NON-CDC HGH THRU: HCPCS | Performed by: NURSE PRACTITIONER

## 2021-12-07 PROCEDURE — 99213 OFFICE O/P EST LOW 20 MIN: CPT | Performed by: NURSE PRACTITIONER

## 2021-12-07 RX ORDER — AMOXICILLIN AND CLAVULANATE POTASSIUM 875; 125 MG/1; MG/1
1 TABLET, FILM COATED ORAL 2 TIMES DAILY
Qty: 14 TABLET | Refills: 0 | Status: SHIPPED | OUTPATIENT
Start: 2021-12-07 | End: 2021-12-14

## 2021-12-07 NOTE — PROGRESS NOTES
Office Visit      Patient Name: Lyssa Neal  : 1955   MRN: [id number]   Care Team: Patient Care Team:  Vermeesch, Marilyn K, MD as PCP - General (Internal Medicine & Pediatrics)    Chief Complaint  Cough (started yesterday feels like it is settling in her chest), Congestion (rhinitis ), and Rash (right side by rib cage)    Subjective     Subjective      Lyssa Neal presents to [facility] for URI symptoms.  Symptoms started yesterday.   Endorses congestion, shortness of breath with coughing attacks, productive cough yellow in color, fatigue, rhinitis, diarrhea.  Denies fever, pain with coughing, loss of taste/smell, body aches, sinus tenderness.  Feels like congestion is settling into her chest, symptoms are rapidly worsening.  She also complains of a rash under the right breast. It has been present for several months. It is not bothersome but it never goes completely away. She has used hydrocortisone cream as needed for pruritis. No history of diabetes or excess sweating. No animals in the home, new lotion/soap/laundry detergent, and the rash is localized to one area.  Describes rash as bumpy and can be felt more than seen.  Has been vaccinated for COVID in May. No known exposure.  is ill with similar symptoms.   She has tried nyquil, dayquil, and robitussin for the symptoms helps temporarily.     Review of Systems   Constitutional: Positive for fatigue. Negative for chills and fever.   HENT: Positive for congestion and postnasal drip. Negative for sinus pressure, sneezing, sore throat, swollen glands and trouble swallowing.    Respiratory: Positive for cough and shortness of breath (With coughing). Negative for wheezing.    Cardiovascular: Negative for chest pain.   Gastrointestinal: Positive for diarrhea. Negative for abdominal pain, nausea and vomiting.   Skin: Positive for rash.   Neurological: Positive for headache.   Psychiatric/Behavioral: Negative for  "sleep disturbance.       Objective     Objective   Vital Signs:   /82   Pulse 88   Temp 97.7 °F (36.5 °C) (Temporal)   Ht 162.6 cm (64.02\")   Wt 43.7 kg (96 lb 6.4 oz)   SpO2 98%   BMI 16.54 kg/m²     Physical Exam  Vitals and nursing note reviewed.   Constitutional:       General: She is not in acute distress.     Appearance: Normal appearance. She is ill-appearing. She is not toxic-appearing.   HENT:      Right Ear: Ear canal normal. A middle ear effusion is present. Tympanic membrane is not bulging.      Left Ear: Ear canal normal. A middle ear effusion is present. Tympanic membrane is not bulging.      Nose: Nose normal. No congestion or rhinorrhea.      Right Sinus: No maxillary sinus tenderness or frontal sinus tenderness.      Left Sinus: No maxillary sinus tenderness or frontal sinus tenderness.      Mouth/Throat:      Mouth: Mucous membranes are moist.      Pharynx: Posterior oropharyngeal erythema (PND) present.   Eyes:      Pupils: Pupils are equal, round, and reactive to light.   Cardiovascular:      Rate and Rhythm: Normal rate and regular rhythm.      Heart sounds: Normal heart sounds. No murmur heard.      Pulmonary:      Effort: Pulmonary effort is normal. No respiratory distress.      Breath sounds: Normal breath sounds. No wheezing.   Abdominal:      General: Bowel sounds are normal. There is no distension.      Palpations: Abdomen is soft.      Tenderness: There is no abdominal tenderness.   Musculoskeletal:      Cervical back: Neck supple. No tenderness.   Lymphadenopathy:      Head:      Right side of head: No submental, submandibular or tonsillar adenopathy.      Left side of head: No submental, submandibular or tonsillar adenopathy.      Cervical: No cervical adenopathy.   Skin:     General: Skin is warm and dry.      Findings: Rash (Slightly raised small papules that are skin colored) present.          Neurological:      Mental Status: She is alert.   Psychiatric:         Mood and " Affect: Mood normal.         Behavior: Behavior normal.       Assessment / Plan      Assessment/Plan   Problem List Items Addressed This Visit     None      Visit Diagnoses     Chest congestion    -  Primary    Relevant Orders    COVID-19 PCR, [facility], NP SWAB IN [facility]AL TRANSPORT MEDIA/ORAL SWISH 24-30 HR TAT - Swab, Nasopharynx    Acute URI        Relevant Medications    amoxicillin-clavulanate (Augmentin) 875-125 MG per tablet    Other Relevant Orders    COVID-19 PCR, [facility], NP SWAB IN [facility]AL TRANSPORT MEDIA/ORAL SWISH 24-30 HR TAT - Swab, Nasopharynx    Suspected COVID-19 virus infection        Relevant Orders    COVID-19 PCR, [facility], NP SWAB IN [facility] V[facility]AL TRANSPORT MEDIA/ORAL SWISH 24-30 HR TAT - Swab, Nasopharynx    Recommend testing for Covid today, she is hesitant.  I advised that this is my recommendation to her and she does have the right to refuse testing but does have concerning symptoms including fatigue, congestion, and cough.  She is agreeable.  Advised to quarantine until results are back and negative, if positive will recommend quarantine 10 days from symptom onset.  Due to rapid worsening with shortness of breath will treat for bacterial causes with Augmentin twice daily with food.  Recommend Mucinex twice daily, Delsym as needed, increase water intake, rest, and Tylenol/ibuprofen as needed.  Follow-up with any acute worsening or persisting symptoms.    Rash        Unclear etiology, dry skin versus keratosis pilaris although would be an odd location.  It is not bothersome to her and without red flags.  Discontinue use of hydrocortisone cream.  Can try gentle exfoliant with salicylic acid and moisturizing nonfragrant lotion to the site.  Follow-up with persisting symptoms.           Follow Up   Return if symptoms worsen or fail to improve.  Patient was given instructions and counseling regarding her condition or for health maintenance advice. Please see specific information  pulled into the AVS if appropriate.     ROSA ELENA Montiel  [facility]

## 2021-12-08 LAB — SARS-COV-2 RNA NOSE QL NAA+PROBE: NOT DETECTED

## 2022-09-02 ENCOUNTER — OFFICE VISIT (OUTPATIENT)
Dept: INTERNAL MEDICINE | Facility: CLINIC | Age: 67
End: 2022-09-02

## 2022-09-02 VITALS
HEART RATE: 71 BPM | HEIGHT: 64 IN | TEMPERATURE: 97.3 F | SYSTOLIC BLOOD PRESSURE: 124 MMHG | OXYGEN SATURATION: 99 % | BODY MASS INDEX: 16.22 KG/M2 | WEIGHT: 95 LBS | DIASTOLIC BLOOD PRESSURE: 70 MMHG

## 2022-09-02 DIAGNOSIS — F41.9 ANXIETY AND DEPRESSION: ICD-10-CM

## 2022-09-02 DIAGNOSIS — Z00.00 ENCOUNTER FOR MEDICARE ANNUAL WELLNESS EXAM: Primary | ICD-10-CM

## 2022-09-02 DIAGNOSIS — F32.A ANXIETY AND DEPRESSION: ICD-10-CM

## 2022-09-02 PROCEDURE — G0439 PPPS, SUBSEQ VISIT: HCPCS | Performed by: INTERNAL MEDICINE

## 2022-09-02 PROCEDURE — 1170F FXNL STATUS ASSESSED: CPT | Performed by: INTERNAL MEDICINE

## 2022-09-02 PROCEDURE — 1159F MED LIST DOCD IN RCRD: CPT | Performed by: INTERNAL MEDICINE

## 2022-09-02 RX ORDER — CITALOPRAM 10 MG/1
10 TABLET ORAL DAILY
Qty: 30 TABLET | Refills: 1 | Status: SHIPPED | OUTPATIENT
Start: 2022-09-02 | End: 2023-02-28

## 2022-09-02 RX ORDER — BUSPIRONE HYDROCHLORIDE 10 MG/1
10 TABLET ORAL 3 TIMES DAILY PRN
Qty: 60 TABLET | Refills: 3 | Status: SHIPPED | OUTPATIENT
Start: 2022-09-02 | End: 2023-02-28

## 2022-09-02 NOTE — PROGRESS NOTES
The ABCs of the Annual Wellness Visit  Subsequent Medicare Wellness Visit    Chief Complaint   Patient presents with   • Medicare Wellness-subsequent     Would like to discuss going back on an anxiety/depression medication.      Subjective    History of Present Illness:  Lyssa Neal is a 67 y.o. female who presents for a Subsequent Medicare Wellness Visit.  Colonoscopy 2013-due next yr with Dr Mcdaniel, mammogram August 2021 and currently due.  DEXA 2019 per GYN, she does this regularly.  She has had 1 COVID-vaccine with a booster in Dec 2021 and 1 pneumococcal vaccine last year.  PMH of anxiety and depression, arthritis and osteoporosis.  Patient is complaining of problems with anxiety and depression. She has more issues with anxiety.  She has been on Wellbutrin in the past and did fairly well but weaned off.  We changed to duloxetine but she had side effects and stopped medication.  She complained of fatigue and sleepiness with Lexapro. She is on HRT per her GYN.  She is taking vit D daily.      The following portions of the patient's history were reviewed and   updated as appropriate: allergies, current medications, past family history, past medical history, past social history, past surgical history and problem list.    Compared to one year ago, the patient feels her physical   health is better.    Compared to one year ago, the patient feels her mental   health is worse.    Recent Hospitalizations:  She was not admitted to the hospital during the last year.       Current Medical Providers:  Patient Care Team:  Vermeesch, Marilyn K, MD as PCP - General (Internal Medicine & Pediatrics)    Outpatient Medications Prior to Visit   Medication Sig Dispense Refill   • CBD (cannabidiol) oral oil Take  by mouth.     • ciclopirox (PENLAC) 8 % solution Apply  topically to the appropriate area as directed Every Night. 6 mL 1   • estradiol (ESTRACE) 2 MG tablet Take 2 mg by mouth daily.     • fluticasone (FLONASE) 50 MCG/ACT  "nasal spray 2 sprays into the nostril(s) as directed by provider Daily. 2 puffs each nostril 1 bottle 0   • Multiple Vitamins-Minerals (CENTRUM SILVER ULTRA WOMENS PO) Take  by mouth daily.     • Probiotic Product (PROBIOTIC DAILY PO) Take  by mouth.     • busPIRone (BUSPAR) 10 MG tablet Take 1 tablet by mouth 3 (Three) Times a Day As Needed (anxiety). 60 tablet 3   • escitalopram (LEXAPRO) 5 MG tablet Take 1 tablet by mouth Daily. 30 tablet 3     No facility-administered medications prior to visit.       No opioid medication identified on active medication list. I have reviewed chart for other potential  high risk medication/s and harmful drug interactions in the elderly.          Aspirin is not on active medication list.  Aspirin use is not indicated based on review of current medical condition/s. Risk of harm outweighs potential benefits.  .    Patient Active Problem List   Diagnosis   • Actinic keratosis   • Arthritis   • Osteoporosis   • Screening for colon cancer   • Encounter for Medicare annual wellness exam   • Anxiety and depression     Advance Care Planning  Advance Directive is not on file.  ACP discussion was held with the patient during this visit. Patient does not have an advance directive, information provided.    Review of Systems   Constitutional: Negative.    HENT: Positive for tinnitus.    Eyes: Negative.    Respiratory: Negative.    Cardiovascular: Negative.    Gastrointestinal: Negative.    Endocrine: Negative.    Genitourinary: Negative.    Musculoskeletal: Negative.    Skin: Negative.    Allergic/Immunologic: Positive for environmental allergies.   Neurological: Negative.    Hematological: Negative.    Psychiatric/Behavioral: Positive for sleep disturbance. The patient is nervous/anxious.         Objective    Vitals:    09/02/22 1031   BP: 124/70   Pulse: 71   Temp: 97.3 °F (36.3 °C)   SpO2: 99%   Weight: 43.1 kg (95 lb)   Height: 162.6 cm (64.02\")   PainSc: 0-No pain     Estimated body mass " "index is 16.3 kg/m² as calculated from the following:    Height as of this encounter: 162.6 cm (64.02\").    Weight as of this encounter: 43.1 kg (95 lb).    BMI is below normal parameters (malnutrition). Recommendations: Recommend boost or Ensure supplement between meals twice daily      Does the patient have evidence of cognitive impairment? No    Physical Exam  Vitals and nursing note reviewed.   Constitutional:       General: She is not in acute distress.     Appearance: Normal appearance. She is well-developed. She is not ill-appearing.      Comments: Kind and pleasant female, appears stated age and in NAD today   HENT:      Head: Normocephalic and atraumatic.      Right Ear: Tympanic membrane, ear canal and external ear normal.      Left Ear: Tympanic membrane, ear canal and external ear normal.      Nose: No congestion.      Mouth/Throat:      Pharynx: No posterior oropharyngeal erythema.   Eyes:      General:         Right eye: No discharge.         Left eye: No discharge.      Extraocular Movements: Extraocular movements intact.      Conjunctiva/sclera: Conjunctivae normal.      Pupils: Pupils are equal, round, and reactive to light.   Neck:      Thyroid: No thyromegaly.      Vascular: No carotid bruit.      Comments: No thyromegaly or mass  Cardiovascular:      Rate and Rhythm: Normal rate and regular rhythm.      Pulses: Normal pulses.      Heart sounds: Normal heart sounds. No murmur heard.  Pulmonary:      Effort: Pulmonary effort is normal. No respiratory distress.      Breath sounds: Normal breath sounds. No wheezing.   Abdominal:      General: Bowel sounds are normal. There is no distension.      Palpations: Abdomen is soft.      Tenderness: There is no abdominal tenderness.   Musculoskeletal:         General: Normal range of motion.      Cervical back: Normal range of motion and neck supple.      Right lower leg: No edema.      Left lower leg: No edema.   Lymphadenopathy:      Cervical: No cervical " adenopathy.   Skin:     General: Skin is warm.      Findings: No rash.   Neurological:      General: No focal deficit present.      Mental Status: She is alert and oriented to person, place, and time. Mental status is at baseline.      Cranial Nerves: No cranial nerve deficit.      Motor: No weakness.      Coordination: Coordination normal.      Gait: Gait normal.   Psychiatric:         Attention and Perception: Attention normal.         Mood and Affect: Mood is anxious.         Speech: Speech normal.         Behavior: Behavior normal.         Thought Content: Thought content normal.         Cognition and Memory: Cognition and memory normal.         Judgment: Judgment normal.                 HEALTH RISK ASSESSMENT    Smoking Status:  Social History     Tobacco Use   Smoking Status Never Smoker   Smokeless Tobacco Never Used     Alcohol Consumption:  Social History     Substance and Sexual Activity   Alcohol Use Yes    Comment: Social     Fall Risk Screen:    [facility] Fall Risk Assessment was completed, and patient is at LOW risk for falls.Assessment completed on:9/2/2022    Depression Screening:  PHQ-2/PHQ-9 Depression Screening 9/2/2022   Retired PHQ-9 Total Score -   Retired Total Score -   Little Interest or Pleasure in Doing Things 0-->not at all   Feeling Down, Depressed or Hopeless 1-->several days   PHQ-9: Brief Depression Severity Measure Score 1       Health Habits and Functional and Cognitive Screening:  Functional & Cognitive Status 9/2/2022   Do you have difficulty preparing food and eating? No   Do you have difficulty bathing yourself, getting dressed or grooming yourself? No   Do you have difficulty using the toilet? No   Do you have difficulty moving around from place to place? No   Do you have trouble with steps or getting out of a bed or a chair? No   Current Diet Well Balanced Diet   Dental Exam Up to date   Eye Exam Up to date   Exercise (times per week) 3 times per week   Current Exercises Include  Walking;Light Weights   Current Exercise Activities Include -   Do you need help using the phone?  No   Are you deaf or do you have serious difficulty hearing?  No   Do you need help with transportation? No   Do you need help shopping? No   Do you need help preparing meals?  No   Do you need help with housework?  No   Do you need help with laundry? No   Do you need help taking your medications? No   Do you need help managing money? No   Do you ever drive or ride in a car without wearing a seat belt? No   Have you felt unusual stress, anger or loneliness in the last month? No   Who do you live with? Spouse   If you need help, do you have trouble finding someone available to you? No   Have you been bothered in the last four weeks by sexual problems? No   Do you have difficulty concentrating, remembering or making decisions? No       Age-appropriate Screening Schedule:  Refer to the list below for future screening recommendations based on patient's age, sex and/or medical conditions. Orders for these recommended tests are listed in the plan section. The patient has been provided with a written plan.    Health Maintenance   Topic Date Due   • DXA SCAN  08/15/2021   • INFLUENZA VACCINE  10/01/2022   • MAMMOGRAM  08/23/2023   • PAP SMEAR  Discontinued   • TDAP/TD VACCINES  Discontinued   • ZOSTER VACCINE  Discontinued              Assessment & Plan   CMS Preventative Services Quick Reference  Risk Factors Identified During Encounter  None Identified  The above risks/problems have been discussed with the patient.  Follow up actions/plans if indicated are seen below in the Assessment/Plan Section.  Pertinent information has been shared with the patient in the After Visit Summary.    Diagnoses and all orders for this visit:    1. Encounter for Medicare annual wellness exam (Primary)  -     CBC & Differential  -     Comprehensive Metabolic Panel  -     Lipid Panel With / Chol / HDL Ratio  -     TSH    2. Anxiety and  depression  -     TSH    Other orders  -     citalopram (CeleXA) 10 MG tablet; Take 1 tablet by mouth Daily.  Dispense: 30 tablet; Refill: 1  -     busPIRone (BUSPAR) 10 MG tablet; Take 1 tablet by mouth 3 (Three) Times a Day As Needed (anxiety).  Dispense: 60 tablet; Refill: 3    Labs as noted  Lengthy discussion with patient today regarding her chronic anxiety issues.  Initially she was fine with starting daily medication and then she was reluctant to take Celexa.  I have provided Celexa for her and encouraged her to take this to prevent daily anxiety issues.  I recommend Celexa 5 mg every evening for 3 weeks, then increase dose to 10 mg daily, possible side effects discussed with patient  She was also provided with refill of BuSpar 10 mg 3 times a day as needed for anxiety.  She was told she could take this medication alone instead of Celexa or in combination with Celexa for her anxiety issues.  I explained to her if she is having any depression BuSpar would not be helpful for that  Follow-up with GYN for breast exam and pelvic due to use of estradiol  Encourage COVID-19 booster vaccine  Patient states she gets her DEXA scan per GYN  It is time for mammogram however patient does not want me to order this, states that she prefers to get this done through GYN  Encourage patient to eat 3 meals a day with boost or Ensure supplement between meals due to low BMI    Follow Up:   Return in about 6 months (around 3/2/2023) for Next scheduled follow up.     An After Visit Summary and PPPS were made available to the patient.

## 2022-09-14 LAB
ALBUMIN SERPL-MCNC: 4.3 G/DL (ref 3.5–5.2)
ALBUMIN/GLOB SERPL: 1.5 G/DL
ALP SERPL-CCNC: 49 U/L (ref 39–117)
ALT SERPL-CCNC: 22 U/L (ref 1–33)
AST SERPL-CCNC: 26 U/L (ref 1–32)
BASOPHILS # BLD AUTO: 0.11 10*3/MM3 (ref 0–0.2)
BASOPHILS NFR BLD AUTO: 2.1 % (ref 0–1.5)
BILIRUB SERPL-MCNC: 0.2 MG/DL (ref 0–1.2)
BUN SERPL-MCNC: 9 MG/DL (ref 8–23)
BUN/CREAT SERPL: 15.8 (ref 7–25)
CALCIUM SERPL-MCNC: 9.1 MG/DL (ref 8.6–10.5)
CHLORIDE SERPL-SCNC: 96 MMOL/L (ref 98–107)
CHOLEST SERPL-MCNC: 166 MG/DL (ref 0–200)
CHOLEST/HDLC SERPL: 2.13 {RATIO}
CO2 SERPL-SCNC: 27.8 MMOL/L (ref 22–29)
CREAT SERPL-MCNC: 0.57 MG/DL (ref 0.57–1)
EGFRCR-CYS SERPLBLD CKD-EPI 2021: 98.5 ML/MIN/1.73
EOSINOPHIL # BLD AUTO: 0.29 10*3/MM3 (ref 0–0.4)
EOSINOPHIL NFR BLD AUTO: 5.4 % (ref 0.3–6.2)
ERYTHROCYTE [DISTWIDTH] IN BLOOD BY AUTOMATED COUNT: 11.7 % (ref 12.3–15.4)
GLOBULIN SER CALC-MCNC: 2.8 GM/DL
GLUCOSE SERPL-MCNC: 90 MG/DL (ref 65–99)
HCT VFR BLD AUTO: 38.1 % (ref 34–46.6)
HDLC SERPL-MCNC: 78 MG/DL (ref 40–60)
HGB BLD-MCNC: 13 G/DL (ref 12–15.9)
IMM GRANULOCYTES # BLD AUTO: 0.01 10*3/MM3 (ref 0–0.05)
IMM GRANULOCYTES NFR BLD AUTO: 0.2 % (ref 0–0.5)
LDLC SERPL CALC-MCNC: 69 MG/DL (ref 0–100)
LYMPHOCYTES # BLD AUTO: 1.29 10*3/MM3 (ref 0.7–3.1)
LYMPHOCYTES NFR BLD AUTO: 24.2 % (ref 19.6–45.3)
MCH RBC QN AUTO: 31.4 PG (ref 26.6–33)
MCHC RBC AUTO-ENTMCNC: 34.1 G/DL (ref 31.5–35.7)
MCV RBC AUTO: 92 FL (ref 79–97)
MONOCYTES # BLD AUTO: 0.68 10*3/MM3 (ref 0.1–0.9)
MONOCYTES NFR BLD AUTO: 12.7 % (ref 5–12)
NEUTROPHILS # BLD AUTO: 2.96 10*3/MM3 (ref 1.7–7)
NEUTROPHILS NFR BLD AUTO: 55.4 % (ref 42.7–76)
NRBC BLD AUTO-RTO: 0 /100 WBC (ref 0–0.2)
PLATELET # BLD AUTO: 269 10*3/MM3 (ref 140–450)
POTASSIUM SERPL-SCNC: 4.9 MMOL/L (ref 3.5–5.2)
PROT SERPL-MCNC: 7.1 G/DL (ref 6–8.5)
RBC # BLD AUTO: 4.14 10*6/MM3 (ref 3.77–5.28)
SODIUM SERPL-SCNC: 134 MMOL/L (ref 136–145)
TRIGL SERPL-MCNC: 109 MG/DL (ref 0–150)
TSH SERPL DL<=0.005 MIU/L-ACNC: 6.82 UIU/ML (ref 0.27–4.2)
VLDLC SERPL CALC-MCNC: 19 MG/DL (ref 5–40)
WBC # BLD AUTO: 5.34 10*3/MM3 (ref 3.4–10.8)

## 2022-09-15 NOTE — PROGRESS NOTES
Please contact patient with lab results.  Complete blood count shows no evidence of anemia.  Kidney and liver function are normal.  Cholesterol panel is good with high HDL or good cholesterol which is protective for her heart.  Thyroid function is abnormal with elevated TSH.  I note that this runs in her family with sister.  Patient has some underlying anxiety, sleep and depression issues.  Please ask her if she is having any hair loss, constipation issues or difficulty swallowing.  These are also associated with thyroid issues.  If she is having any of these other symptoms I recommend a low-dose of thyroid medication, levothyroxine 25 mcg daily.  If she is agreeable you may send me this order number 30 tablets with 1 refill as well as an order for repeat TSH and free T4 to be done in 6 weeks.  If she does not want any thyroid medication and just wants to try medication for her depression as provided we will wait to see how she feels on new medication.  Thank you

## 2023-02-28 ENCOUNTER — OFFICE VISIT (OUTPATIENT)
Dept: INTERNAL MEDICINE | Facility: CLINIC | Age: 68
End: 2023-02-28
Payer: MEDICARE

## 2023-02-28 VITALS
DIASTOLIC BLOOD PRESSURE: 68 MMHG | HEIGHT: 64 IN | SYSTOLIC BLOOD PRESSURE: 120 MMHG | TEMPERATURE: 98 F | OXYGEN SATURATION: 95 % | HEART RATE: 74 BPM | BODY MASS INDEX: 16.05 KG/M2 | WEIGHT: 94 LBS

## 2023-02-28 DIAGNOSIS — F32.A ANXIETY AND DEPRESSION: Primary | ICD-10-CM

## 2023-02-28 DIAGNOSIS — F41.9 ANXIETY AND DEPRESSION: Primary | ICD-10-CM

## 2023-02-28 PROCEDURE — 99213 OFFICE O/P EST LOW 20 MIN: CPT | Performed by: INTERNAL MEDICINE

## 2023-02-28 RX ORDER — BUPROPION HYDROCHLORIDE 150 MG/1
150 TABLET ORAL EVERY MORNING
Qty: 30 TABLET | Refills: 2 | Status: SHIPPED | OUTPATIENT
Start: 2023-02-28

## 2023-02-28 NOTE — PROGRESS NOTES
"Chief Complaint   Patient presents with   • Follow-up     6 months for anxiety and depression. Pt states she has tingling in her feet off and on, usually when she is sitting still.     Subjective   Lyssa Neal is a 67 y.o. female.     History of Present Illness  Here today for follow up of anxiety and depression.   In September, pt was here for physical and she was given celexa and buspar for her sxs.  She never started these medications.   She has been on wellbutrin, duloxetine, lexapro for her anxiety in the past.   She went to [place] over the winter mo and felt she was doing OK, but her  does not feel she is doing well since her sister .  She is not tearful.  She falls asleep well, but only gets about 6 hours of sleep.  She does not always feel well rested.  Patient does not notice that she is depressed or anxious, however, her  feels that she is and wishes that she would start medication.    She complains of tingling in her feet while seated.  She does not notice tingling of her feet while walking or lying down.  Denies numbness.         The following portions of the patient's history were reviewed and updated as appropriate: allergies, current medications, past family history, past medical history, past social history, past surgical history and problem list.    Review of Systems   Neurological:        Tingling of feet while seated   Psychiatric/Behavioral: Positive for dysphoric mood. Negative for sleep disturbance. The patient is nervous/anxious.    All other systems reviewed and are negative.      Objective   /68   Pulse 74   Temp 98 °F (36.7 °C)   Ht 162.6 cm (64.02\")   Wt 42.6 kg (94 lb)   SpO2 95%   BMI 16.12 kg/m²   Body mass index is 16.12 kg/m².  Physical Exam  Vitals and nursing note reviewed.   Constitutional:       General: She is not in acute distress.     Appearance: Normal appearance. She is not ill-appearing.      Comments: Pleasant female in no distress today "   HENT:      Right Ear: External ear normal.      Left Ear: External ear normal.   Eyes:      General:         Right eye: No discharge.         Left eye: No discharge.      Extraocular Movements: Extraocular movements intact.   Pulmonary:      Effort: Pulmonary effort is normal. No respiratory distress.   Neurological:      Mental Status: She is alert and oriented to person, place, and time. Mental status is at baseline.   Psychiatric:         Attention and Perception: Attention normal.         Mood and Affect: Mood is anxious.         Speech: Speech normal.         Behavior: Behavior normal.         Thought Content: Thought content normal.         Cognition and Memory: Cognition normal.         Assessment & Plan   Lyssa Neal is here today and the following problems have been addressed:      Diagnoses and all orders for this visit:    1. Anxiety and depression (Primary)  -     Ambulatory Referral to Psychology    Other orders  -     buPROPion XL (WELLBUTRIN XL) 150 MG 24 hr tablet; Take 1 tablet by mouth Every Morning.  Dispense: 30 tablet; Refill: 2    Patient prefers to resume Wellbutrin  mg daily, she has been on this in the past and did well  She has BuSpar at home to use as needed for anxiety also  Refer to psychologist for counseling, she was given name of the [facility] group and can call when she returns from [place] later this spring    Return to clinic in 6 weeks for follow-up of depression and anxiety with Tracy Sevilla    Part of this note may be an electronic transcription/translation of spoken language to printed text using the Dragon Dictation System.

## 2023-05-10 ENCOUNTER — OFFICE VISIT (OUTPATIENT)
Dept: INTERNAL MEDICINE | Facility: CLINIC | Age: 68
End: 2023-05-10
Payer: MEDICARE

## 2023-05-10 VITALS
BODY MASS INDEX: 16.39 KG/M2 | DIASTOLIC BLOOD PRESSURE: 80 MMHG | HEIGHT: 64 IN | HEART RATE: 72 BPM | WEIGHT: 96 LBS | TEMPERATURE: 97.5 F | OXYGEN SATURATION: 95 % | SYSTOLIC BLOOD PRESSURE: 122 MMHG

## 2023-05-10 DIAGNOSIS — F32.A ANXIETY AND DEPRESSION: Primary | ICD-10-CM

## 2023-05-10 DIAGNOSIS — F41.9 ANXIETY AND DEPRESSION: Primary | ICD-10-CM

## 2023-05-10 PROCEDURE — 1160F RVW MEDS BY RX/DR IN RCRD: CPT | Performed by: NURSE PRACTITIONER

## 2023-05-10 PROCEDURE — 1159F MED LIST DOCD IN RCRD: CPT | Performed by: NURSE PRACTITIONER

## 2023-05-10 PROCEDURE — 99213 OFFICE O/P EST LOW 20 MIN: CPT | Performed by: NURSE PRACTITIONER

## 2023-05-10 RX ORDER — BUPROPION HYDROCHLORIDE 150 MG/1
150 TABLET ORAL EVERY MORNING
Qty: 90 TABLET | Refills: 3 | Status: SHIPPED | OUTPATIENT
Start: 2023-05-10

## 2023-05-10 RX ORDER — BUSPIRONE HYDROCHLORIDE 10 MG/1
10 TABLET ORAL 3 TIMES DAILY
COMMUNITY

## 2023-05-10 NOTE — PROGRESS NOTES
"  Office Visit      Patient Name: Lyssa Neal  : 1955   MRN: [id number]   Care Team: Patient Care Team:  Tracy Sevilla APRN as PCP - General (Family Medicine)    Chief Complaint  Depression    Subjective     Subjective      Lyssa Neal presents to [facility] for depression follow-up.   Started back on bupropion in March by previous PCP.   She is doing well with the medication and denies side effects. Feeling much better since starting and  has noticed a difference.   Denies suicidal ideations, panic attacks, tearful episodes, and weight loss.   She is also taking buspirone once daily. She has not scheduled with the counselor as of yet.  PHQ-2 Depression Screening  Little interest or pleasure in doing things? 0-->not at all   Feeling down, depressed, or hopeless? 0-->not at all   PHQ-2 Total Score 0     She has no acute complaints today.     Objective     Objective   Vital Signs:   /80   Pulse 72   Temp 97.5 °F (36.4 °C)   Ht 162.6 cm (64.02\")   Wt 43.5 kg (96 lb)   SpO2 95%   BMI 16.47 kg/m²     Physical Exam  Vitals and nursing note reviewed.   Constitutional:       General: She is not in acute distress.     Appearance: Normal appearance. She is not toxic-appearing.   Eyes:      Pupils: Pupils are equal, round, and reactive to light.   Cardiovascular:      Rate and Rhythm: Normal rate and regular rhythm.      Heart sounds: Normal heart sounds. No murmur heard.  Pulmonary:      Effort: Pulmonary effort is normal. No respiratory distress.      Breath sounds: Normal breath sounds. No wheezing.   Abdominal:      General: Bowel sounds are normal. There is no distension.      Palpations: Abdomen is soft.      Tenderness: There is no abdominal tenderness.   Musculoskeletal:         General: Normal range of motion.   Skin:     General: Skin is warm and dry.      Findings: No rash.   Neurological:      General: No focal deficit present.      Mental " Status: She is alert.   Psychiatric:         Mood and Affect: Mood normal.         Behavior: Behavior normal.          Assessment / Plan      Assessment & Plan   Problem List Items Addressed This Visit        Mental Health    Anxiety and depression - Primary    Relevant Medications    busPIRone (BUSPAR) 10 MG tablet    buPROPion XL (WELLBUTRIN XL) 150 MG 24 hr tablet    Tolerating bupropion well, continue at current dose. Information given for [facility] for counseling. Recommend healthy diet, exercise as tolerated, daily sun exposure, and strongly encouraged counseling. Follow-up for medicare wellness in September.         Follow Up   Return for Medicare Wellness after 9/2.  Patient was given instructions and counseling regarding her condition or for health maintenance advice. Please see specific information pulled into the AVS if appropriate.     ROSA ELENA Montiel  [facility] - [place]

## 2023-05-23 ENCOUNTER — PATIENT MESSAGE (OUTPATIENT)
Dept: INTERNAL MEDICINE | Facility: CLINIC | Age: 68
End: 2023-05-23
Payer: MEDICARE

## 2023-05-23 DIAGNOSIS — J06.9 URI WITH COUGH AND CONGESTION: ICD-10-CM

## 2023-05-23 RX ORDER — FLUTICASONE PROPIONATE 50 MCG
2 SPRAY, SUSPENSION (ML) NASAL DAILY
Qty: 16 G | Refills: 3 | Status: SHIPPED | OUTPATIENT
Start: 2023-05-23

## 2023-05-23 NOTE — TELEPHONE ENCOUNTER
From: Lyssa Neal  To: Tracy Sevilla  Sent: 2023 12:11 PM EDT  Subject: Flonase/Fluticasone Propionate    Reg Molina! My prescription for Flonase has  so I couldn't request as a refill. [facility]? It is still listed on my prescription list. Thank you!

## 2023-09-05 ENCOUNTER — OFFICE VISIT (OUTPATIENT)
Dept: INTERNAL MEDICINE | Facility: CLINIC | Age: 68
End: 2023-09-05
Payer: MEDICARE

## 2023-09-05 VITALS
TEMPERATURE: 97.9 F | DIASTOLIC BLOOD PRESSURE: 68 MMHG | SYSTOLIC BLOOD PRESSURE: 124 MMHG | BODY MASS INDEX: 16.8 KG/M2 | HEART RATE: 69 BPM | WEIGHT: 98.4 LBS | HEIGHT: 64 IN | OXYGEN SATURATION: 99 %

## 2023-09-05 DIAGNOSIS — Z00.00 MEDICARE ANNUAL WELLNESS VISIT, SUBSEQUENT: Primary | ICD-10-CM

## 2023-09-05 DIAGNOSIS — E46 PROTEIN-CALORIE MALNUTRITION, UNSPECIFIED SEVERITY: ICD-10-CM

## 2023-09-05 DIAGNOSIS — F32.A ANXIETY AND DEPRESSION: ICD-10-CM

## 2023-09-05 DIAGNOSIS — M85.89 OSTEOPENIA OF MULTIPLE SITES: ICD-10-CM

## 2023-09-05 DIAGNOSIS — F41.9 ANXIETY AND DEPRESSION: ICD-10-CM

## 2023-09-05 DIAGNOSIS — Z12.11 COLON CANCER SCREENING: ICD-10-CM

## 2023-09-05 PROCEDURE — G0439 PPPS, SUBSEQ VISIT: HCPCS | Performed by: NURSE PRACTITIONER

## 2023-09-05 PROCEDURE — 1170F FXNL STATUS ASSESSED: CPT | Performed by: NURSE PRACTITIONER

## 2023-09-05 PROCEDURE — 1159F MED LIST DOCD IN RCRD: CPT | Performed by: NURSE PRACTITIONER

## 2023-09-05 PROCEDURE — 1160F RVW MEDS BY RX/DR IN RCRD: CPT | Performed by: NURSE PRACTITIONER

## 2023-09-05 NOTE — PROGRESS NOTES
The ABCs of the Annual Wellness Visit  Subsequent Medicare Wellness Visit    Subjective      Lyssa Neal is a 68 y.o. female who presents for a Subsequent Medicare Wellness Visit.  Here today for Medicare wellness visit with no acute complaints.  Her mood is stable with bupropion and she uses as needed buspirone very sparingly.  She denies any suicidal ideations or panic attacks.  She wishes to continue her current medication regimen.  She follows up with GYN back in [place] and has an appointment next week.  Provider usually orders her DEXA and her mammogram at that visit.  She continues estradiol denies any side effects to the medication.  She is due for labs today.  She had Cologuard 3 years ago that was normal, prefers to repeat test rather than colonoscopy.    The following portions of the patient's history were reviewed and   updated as appropriate: allergies, current medications, past family history, past medical history, past social history, past surgical history, and problem list.    Compared to one year ago, the patient feels her physical   health is the same.    Compared to one year ago, the patient feels her mental   health is better.    Recent Hospitalizations:  She was not admitted to the hospital during the last year.       Current Medical Providers:  Patient Care Team:  Tracy Sevilla APRN as PCP - General (Family Medicine)    Outpatient Medications Prior to Visit   Medication Sig Dispense Refill    buPROPion XL (WELLBUTRIN XL) 150 MG 24 hr tablet Take 1 tablet by mouth Every Morning. 90 tablet 3    busPIRone (BUSPAR) 10 MG tablet Take 1 tablet by mouth 3 (Three) Times a Day.      CBD (cannabidiol) oral oil Take  by mouth.      estradiol (ESTRACE) 2 MG tablet Take 1 tablet by mouth Daily.      fluticasone (Flonase) 50 MCG/ACT nasal spray 2 sprays into the nostril(s) as directed by provider Daily. 2 puffs each nostril 16 g 3    Multiple Vitamins-Minerals (CENTRUM SILVER ULTRA WOMENS PO) Take  " by mouth daily.      Probiotic Product (PROBIOTIC DAILY PO) Take  by mouth.       No facility-administered medications prior to visit.       No opioid medication identified on active medication list. I have reviewed chart for other potential  high risk medication/s and harmful drug interactions in the elderly.        Aspirin is not on active medication list.  Aspirin use is not indicated based on review of current medical condition/s. Risk of harm outweighs potential benefits.  .    Patient Active Problem List   Diagnosis    Actinic keratosis    Arthritis    Osteoporosis    Screening for colon cancer    Encounter for Medicare annual wellness exam    Anxiety and depression     Advance Care Planning   Advance Care Planning     Advance Directive is not on file.  ACP discussion was held with the patient during this visit. Patient does not have an advance directive, information provided.     Objective    Vitals:    09/05/23 1339   BP: 124/68   BP Location: Right arm   Patient Position: Sitting   Cuff Size: Adult   Pulse: 69   Temp: 97.9 °F (36.6 °C)   TempSrc: Temporal   SpO2: 99%   Weight: 44.6 kg (98 lb 6.4 oz)   Height: 162.6 cm (64\")   PainSc: 0-No pain     Estimated body mass index is 16.89 kg/m² as calculated from the following:    Height as of this encounter: 162.6 cm (64\").    Weight as of this encounter: 44.6 kg (98 lb 6.4 oz).    BMI is below normal parameters (malnutrition). Recommendations: treating the underlying disease process      Does the patient have evidence of cognitive impairment?   No    Physical Exam  Vitals and nursing note reviewed.   Constitutional:       General: She is not in acute distress.     Appearance: Normal appearance. She is underweight.   HENT:      Right Ear: Tympanic membrane and ear canal normal.      Left Ear: Tympanic membrane and ear canal normal.      Nose: Nose normal.      Mouth/Throat:      Mouth: Mucous membranes are moist.      Pharynx: Oropharynx is clear. No posterior " oropharyngeal erythema.   Eyes:      Extraocular Movements: Extraocular movements intact.      Pupils: Pupils are equal, round, and reactive to light.   Neck:      Thyroid: No thyroid mass or thyromegaly.      Vascular: No carotid bruit.   Cardiovascular:      Rate and Rhythm: Normal rate and regular rhythm.      Pulses: Normal pulses.      Heart sounds: Normal heart sounds. No murmur heard.  Pulmonary:      Effort: Pulmonary effort is normal.      Breath sounds: Normal breath sounds. No wheezing.   Abdominal:      General: Bowel sounds are normal. There is no distension.      Palpations: Abdomen is soft. There is no mass.      Tenderness: There is no abdominal tenderness.   Genitourinary:     Comments: Deferred per gyn     Musculoskeletal:         General: No deformity. Normal range of motion.      Cervical back: Normal range of motion and neck supple. No muscular tenderness.   Lymphadenopathy:      Head:      Right side of head: No submandibular, tonsillar, preauricular or posterior auricular adenopathy.      Left side of head: No submandibular, tonsillar, preauricular or posterior auricular adenopathy.      Cervical: No cervical adenopathy.   Skin:     General: Skin is warm and dry.      Capillary Refill: Capillary refill takes less than 2 seconds.      Findings: No bruising or rash.   Neurological:      General: No focal deficit present.      Mental Status: She is alert and oriented to person, place, and time.      Gait: Gait normal.      Deep Tendon Reflexes: Reflexes normal.   Psychiatric:         Mood and Affect: Mood normal.         Behavior: Behavior normal.         HEALTH RISK ASSESSMENT    Smoking Status:  Social History     Tobacco Use   Smoking Status Never   Smokeless Tobacco Never     Alcohol Consumption:  Social History     Substance and Sexual Activity   Alcohol Use Yes    Comment: Social     Fall Risk Screen:    STEADI Fall Risk Assessment was completed, and patient is at LOW risk for  falls.Assessment completed on:2023    Depression Screenin/5/2023     1:00 PM   PHQ-2/PHQ-9 Depression Screening   Little Interest or Pleasure in Doing Things 0-->not at all   Feeling Down, Depressed or Hopeless 0-->not at all   PHQ-9: Brief Depression Severity Measure Score 0       Health Habits and Functional and Cognitive Screenin/5/2023     1:00 PM   Functional & Cognitive Status   Do you have difficulty preparing food and eating? No   Do you have difficulty bathing yourself, getting dressed or grooming yourself? No   Do you have difficulty using the toilet? No   Do you have difficulty moving around from place to place? No   Do you have trouble with steps or getting out of a bed or a chair? No   Current Diet Well Balanced Diet   Dental Exam Up to date   Eye Exam Up to date   Exercise (times per week) 4 times per week   Current Exercises Include Walking;Weightlifting   Do you need help using the phone?  No   Are you deaf or do you have serious difficulty hearing?  No   Do you need help to go to places out of walking distance? No   Do you need help shopping? No   Do you need help preparing meals?  No   Do you need help with housework?  No   Do you need help with laundry? No   Do you need help taking your medications? No   Do you need help managing money? No   Do you ever drive or ride in a car without wearing a seat belt? No   Have you felt unusual stress, anger or loneliness in the last month? No   Who do you live with? Spouse   If you need help, do you have trouble finding someone available to you? No   Have you been bothered in the last four weeks by sexual problems? No   Do you have difficulty concentrating, remembering or making decisions? No       Age-appropriate Screening Schedule:  Refer to the list below for future screening recommendations based on patient's age, sex and/or medical conditions. Orders for these recommended tests are listed in the plan section. The patient has been  provided with a written plan.    Health Maintenance   Topic Date Due    DXA SCAN  08/15/2021    COVID-19 Vaccine (3 - Booster for Rachna series) 02/22/2022    Pneumococcal Vaccine 65+ (2 - PCV) 10/13/2022    COLORECTAL CANCER SCREENING  07/31/2023    MAMMOGRAM  08/23/2023    BMI FOLLOWUP  09/02/2023    INFLUENZA VACCINE  10/01/2023    ANNUAL WELLNESS VISIT  09/05/2024    HEPATITIS C SCREENING  Completed    PAP SMEAR  Discontinued    TDAP/TD VACCINES  Discontinued    ZOSTER VACCINE  Discontinued                  CMS Preventative Services Quick Reference  Risk Factors Identified During Encounter:    Immunizations Discussed/Encouraged: Shingrixshe will have performed at her pharmacy.  We discussed PCV and she politely declines at this time.  Vision Screening Recommended    The above risks/problems have been discussed with the patient.  Pertinent information has been shared with the patient in the After Visit Summary.    Diagnoses and all orders for this visit:    1. Medicare annual wellness visit, subsequent (Primary)  Definitive health care discussed during visit and information provided in AVS.  Recommend Shingrix and encouraged PCV 20, Cologuard for colon cancer screening, follow-up with GYN for mammogram and DEXA.  2.  Osteopenia of multiple sites  -     CBC No Differential  -     Comprehensive metabolic panel  -     Lipid panel  -     TSH Rfx On Abnormal To Free T4  Continue estradiol, vitamin D and calcium supplement, and light weightbearing exercise.  Update labs today and keep follow-up with GYN who manages.  3. Anxiety and depression  -     CBC No Differential  -     Comprehensive metabolic panel  -     Lipid panel  -     TSH Rfx On Abnormal To Free T4  Stable.  Continue bupropion daily and buspirone as needed.  Encouraged healthy diet, exercise, daily sun exposure, stress management, and sleep hygiene.  4. Protein-calorie malnutrition, unspecified severity  -     CBC No Differential  -     Comprehensive  metabolic panel  -     Lipid panel  -     TSH Rfx On Abnormal To Free T4  Weight has historically been low, her vitamin D has always been normal and she uses supplementation due to her osteopenia.  Encouraged light weightbearing exercise, high-protein diet, and continue supplementation.  5. Colon cancer screening  -     Cologuard - Stool, Per Rectum; Future      Follow Up:   Next Medicare Wellness visit to be scheduled in 1 year.      An After Visit Summary and PPPS were made available to the patient.

## 2023-09-05 NOTE — PATIENT INSTRUCTIONS
Advance Care Planning and Advance Directives     You make decisions on a daily basis - decisions about where you want to live, your career, your home, your life. Perhaps one of the most important decisions you face is your choice for future medical care. Take time to talk with your family and your healthcare team and start planning today.  Advance Care Planning is a process that can help you:  Understand possible future healthcare decisions in light of your own experiences  Reflect on those decision in light of your goals and values  Discuss your decisions with those closest to you and the healthcare professionals that care for you  Make a plan by creating a document that reflects your wishes    Surrogate Decision Maker  In the event of a medical emergency, which has left you unable to communicate or to make your own decisions, you would need someone to make decisions for you.  It is important to discuss your preferences for medical treatment with this person while you are in good health.     Qualities of a surrogate decision maker:  Willing to take on this role and responsibility  Knows what you want for future medical care  Willing to follow your wishes even if they don't agree with them  Able to make difficult medical decisions under stressful circumstances    Advance Directives  These are legal documents you can create that will guide your healthcare team and decision maker(s) when needed. These documents can be stored in the electronic medical record.    Living Will - a legal document to guide your care if you have a terminal condition or a serious illness and are unable to communicate. States vary by statute in document names/types, but most forms may include one or more of the following:        -  Directions regarding life-prolonging treatments        -  Directions regarding artificially provided nutrition/hydration        -  Choosing a healthcare decision maker        -  Direction regarding organ/tissue  donation    Durable Power of  for Healthcare - this document names an -in-fact to make medical decisions for you, but it may also allow this person to make personal and financial decisions for you. Please seek the advice of an  if you need this type of document.    **Advance Directives are not required and no one may discriminate against you if you do not sign one.    Medical Orders  Many states allow specific forms/orders signed by your physician to record your wishes for medical treatment in your current state of health. This form, signed in personal communication with your physician, addresses resuscitation and other medical interventions that you may or may not want.      For more information or to schedule a time with a [facility] Advance Care Planning Facilitator contact: [employer] or call [phone] and someone will contact you directly.    Medicare Wellness  Personal Prevention Plan of Service     Date of Office Visit:    Encounter Provider:  ROSA ELENA Dow  Place of Service:  [facility]  Patient Name: Lyssa Neal  :  1955    As part of the Medicare Wellness portion of your visit today, we are providing you with this personalized preventive plan of services (PPPS). This plan is based upon recommendations of the [place] Preventive Services Task Force (USPSTF) and the Advisory Committee on Immunization Practices (ACIP).    This lists the preventive care services that should be considered, and provides dates of when you are due. Items listed as completed are up-to-date and do not require any further intervention.    Health Maintenance   Topic Date Due   • DXA SCAN  08/15/2021   • COVID-19 Vaccine (3 - Booster for Rachna series) 2022   • Pneumococcal Vaccine 65+ (2 - PCV) 10/13/2022   • COLORECTAL CANCER SCREENING  2023   • MAMMOGRAM  2023   • BMI FOLLOWUP  2023   • INFLUENZA VACCINE   10/01/2023   • ANNUAL WELLNESS VISIT  09/05/2024   • HEPATITIS C SCREENING  Completed   • PAP SMEAR  Discontinued   • TDAP/TD VACCINES  Discontinued   • ZOSTER VACCINE  Discontinued       No orders of the defined types were placed in this encounter.      No follow-ups on file.

## 2023-09-06 LAB
ALBUMIN SERPL-MCNC: 4.5 G/DL (ref 3.5–5.2)
ALBUMIN/GLOB SERPL: 1.7 G/DL
ALP SERPL-CCNC: 44 U/L (ref 39–117)
ALT SERPL-CCNC: 18 U/L (ref 1–33)
AST SERPL-CCNC: 22 U/L (ref 1–32)
BILIRUB SERPL-MCNC: 0.2 MG/DL (ref 0–1.2)
BUN SERPL-MCNC: 9 MG/DL (ref 8–23)
BUN/CREAT SERPL: 17.3 (ref 7–25)
CALCIUM SERPL-MCNC: 9.5 MG/DL (ref 8.6–10.5)
CHLORIDE SERPL-SCNC: 102 MMOL/L (ref 98–107)
CHOLEST SERPL-MCNC: 166 MG/DL (ref 0–200)
CO2 SERPL-SCNC: 26.4 MMOL/L (ref 22–29)
CREAT SERPL-MCNC: 0.52 MG/DL (ref 0.57–1)
EGFRCR SERPLBLD CKD-EPI 2021: 100.1 ML/MIN/1.73
ERYTHROCYTE [DISTWIDTH] IN BLOOD BY AUTOMATED COUNT: 12.2 % (ref 12.3–15.4)
GLOBULIN SER CALC-MCNC: 2.6 GM/DL
GLUCOSE SERPL-MCNC: 78 MG/DL (ref 65–99)
HCT VFR BLD AUTO: 38.5 % (ref 34–46.6)
HDLC SERPL-MCNC: 79 MG/DL (ref 40–60)
HGB BLD-MCNC: 13 G/DL (ref 12–15.9)
LDLC SERPL CALC-MCNC: 75 MG/DL (ref 0–100)
MCH RBC QN AUTO: 31.5 PG (ref 26.6–33)
MCHC RBC AUTO-ENTMCNC: 33.8 G/DL (ref 31.5–35.7)
MCV RBC AUTO: 93.2 FL (ref 79–97)
PLATELET # BLD AUTO: 277 10*3/MM3 (ref 140–450)
POTASSIUM SERPL-SCNC: 4.4 MMOL/L (ref 3.5–5.2)
PROT SERPL-MCNC: 7.1 G/DL (ref 6–8.5)
RBC # BLD AUTO: 4.13 10*6/MM3 (ref 3.77–5.28)
SODIUM SERPL-SCNC: 138 MMOL/L (ref 136–145)
TRIGL SERPL-MCNC: 60 MG/DL (ref 0–150)
TSH SERPL DL<=0.005 MIU/L-ACNC: 4 UIU/ML (ref 0.27–4.2)
VLDLC SERPL CALC-MCNC: 12 MG/DL (ref 5–40)
WBC # BLD AUTO: 4.17 10*3/MM3 (ref 3.4–10.8)

## 2024-04-09 RX ORDER — BUPROPION HYDROCHLORIDE 150 MG/1
150 TABLET ORAL EVERY MORNING
Qty: 90 TABLET | Refills: 3 | Status: SHIPPED | OUTPATIENT
Start: 2024-04-09

## 2024-09-06 ENCOUNTER — OFFICE VISIT (OUTPATIENT)
Dept: INTERNAL MEDICINE | Facility: CLINIC | Age: 69
End: 2024-09-06
Payer: MEDICARE

## 2024-09-06 VITALS
SYSTOLIC BLOOD PRESSURE: 118 MMHG | WEIGHT: 97 LBS | OXYGEN SATURATION: 98 % | BODY MASS INDEX: 16.56 KG/M2 | TEMPERATURE: 97.3 F | RESPIRATION RATE: 16 BRPM | HEART RATE: 80 BPM | HEIGHT: 64 IN | DIASTOLIC BLOOD PRESSURE: 68 MMHG

## 2024-09-06 DIAGNOSIS — Z71.85 IMMUNIZATION COUNSELING: ICD-10-CM

## 2024-09-06 DIAGNOSIS — F32.A ANXIETY AND DEPRESSION: ICD-10-CM

## 2024-09-06 DIAGNOSIS — M85.89 OSTEOPENIA OF MULTIPLE SITES: ICD-10-CM

## 2024-09-06 DIAGNOSIS — Z00.00 MEDICARE ANNUAL WELLNESS VISIT, SUBSEQUENT: Primary | ICD-10-CM

## 2024-09-06 DIAGNOSIS — E46 PROTEIN-CALORIE MALNUTRITION, UNSPECIFIED SEVERITY: ICD-10-CM

## 2024-09-06 DIAGNOSIS — F41.9 ANXIETY AND DEPRESSION: ICD-10-CM

## 2024-09-06 PROBLEM — Z12.11 SCREENING FOR COLON CANCER: Status: RESOLVED | Noted: 2019-07-01 | Resolved: 2024-09-06

## 2024-09-06 RX ORDER — AZITHROMYCIN 250 MG/1
TABLET, FILM COATED ORAL
COMMUNITY
Start: 2024-08-03 | End: 2024-09-06

## 2024-09-06 NOTE — ASSESSMENT & PLAN NOTE
Stable. Continue bupropion and buspirone at current dose. Recommend healthy diet, exercise as tolerated, daily sun exposure, medication compliance and talking with supportive family and friends.

## 2024-09-06 NOTE — PATIENT INSTRUCTIONS
Advance Care Planning and Advance Directives     You make decisions on a daily basis - decisions about where you want to live, your career, your home, your life. Perhaps one of the most important decisions you face is your choice for future medical care. Take time to talk with your family and your healthcare team and start planning today.  Advance Care Planning is a process that can help you:  Understand possible future healthcare decisions in light of your own experiences  Reflect on those decision in light of your goals and values  Discuss your decisions with those closest to you and the healthcare professionals that care for you  Make a plan by creating a document that reflects your wishes    Surrogate Decision Maker  In the event of a medical emergency, which has left you unable to communicate or to make your own decisions, you would need someone to make decisions for you.  It is important to discuss your preferences for medical treatment with this person while you are in good health.     Qualities of a surrogate decision maker:  Willing to take on this role and responsibility  Knows what you want for future medical care  Willing to follow your wishes even if they don't agree with them  Able to make difficult medical decisions under stressful circumstances    Advance Directives  These are legal documents you can create that will guide your healthcare team and decision maker(s) when needed. These documents can be stored in the electronic medical record.    Living Will - a legal document to guide your care if you have a terminal condition or a serious illness and are unable to communicate. States vary by statute in document names/types, but most forms may include one or more of the following:        -  Directions regarding life-prolonging treatments        -  Directions regarding artificially provided nutrition/hydration        -  Choosing a healthcare decision maker        -  Direction regarding organ/tissue  donation    Durable Power of  for Healthcare - this document names an -in-fact to make medical decisions for you, but it may also allow this person to make personal and financial decisions for you. Please seek the advice of an  if you need this type of document.    **Advance Directives are not required and no one may discriminate against you if you do not sign one.    Medical Orders  Many states allow specific forms/orders signed by your physician to record your wishes for medical treatment in your current state of health. This form, signed in personal communication with your physician, addresses resuscitation and other medical interventions that you may or may not want.      For more information or to schedule a time with a [facility] Advance Care Planning Facilitator contact: [facility] [place] or call [phone] and someone will contact you directly.  You are due for a Covid 19 vaccination. (provides protection against Covid 19 Viral Infection) Please  talk to your pharmacist and get the immunization at your local pharmacy at your earliest convenience. Please click on the link for more information about this vaccine.   https://www.cdc.gov/coronavirus/2019-ncov/vaccines/stay-up-to-date.html        Medicare Wellness  Personal Prevention Plan of Service     Date of Office Visit:    Encounter Provider:  ROSA ELENA Dow  Place of Service:  [facility]  Patient Name: Lyssa Neal  :  1955    As part of the Medicare Wellness portion of your visit today, we are providing you with this personalized preventive plan of services (PPPS). This plan is based upon recommendations of the [place] Preventive Services Task Force (USPSTF) and the Advisory Committee on Immunization Practices (ACIP).    This lists the preventive care services that should be considered, and provides dates of when you are due. Items listed as completed are up-to-date  and do not require any further intervention.    Health Maintenance   Topic Date Due   • Pneumococcal Vaccine 65+ (2 of 2 - PCV) 10/13/2022   • COVID-19 Vaccine (3 - 2023-24 season) 09/01/2024   • ANNUAL WELLNESS VISIT  09/05/2024   • INFLUENZA VACCINE  08/01/2024   • MAMMOGRAM  09/11/2025   • DXA SCAN  11/28/2025   • COLORECTAL CANCER SCREENING  09/08/2026   • HEPATITIS C SCREENING  Completed   • PAP SMEAR  Discontinued   • TDAP/TD VACCINES  Discontinued   • ZOSTER VACCINE  Discontinued       No orders of the defined types were placed in this encounter.      No follow-ups on file.

## 2024-09-06 NOTE — ASSESSMENT & PLAN NOTE
Continue estradiol managed by GYN, stay active with light weight bearing exercise, increase calcium in diet and vitamin d encouraged.

## 2024-09-06 NOTE — PROGRESS NOTES
Subjective   The ABCs of the Annual Wellness Visit  Medicare Wellness Visit  Lyssa Neal is a 69 y.o. patient who presents for a Medicare Wellness Visit.  No acute complaints today.   Mood continues to be well controlled with bupropion and buspirone. Denies any side effects from the medication, panic attacks or depressive thoughts.   Weight is stable, continues to follow a healthy balanced diet.   Most recent DEXA by GYN showed stable findings. She continues with estradiol and UTD on mammogram.   The following portions of the patient's history were reviewed and   updated as appropriate: allergies, current medications, past family history, past medical history, past social history, past surgical history, and problem list.    Compared to one year ago, the patient's physical   health is the same.  Compared to one year ago, the patient's mental   health is the same.    Recent Hospitalizations:  She was not admitted to the hospital during the last year.     Current Medical Providers:  Patient Care Team:  Tracy Sevilla APRN as PCP - General (Family Medicine)    Outpatient Medications Prior to Visit   Medication Sig Dispense Refill    buPROPion XL (WELLBUTRIN XL) 150 MG 24 hr tablet TAKE 1 TABLET EVERY MORNING 90 tablet 3    busPIRone (BUSPAR) 10 MG tablet Take 1 tablet by mouth 3 (Three) Times a Day.      CBD (cannabidiol) oral oil Take  by mouth.      estradiol (ESTRACE) 2 MG tablet Take 1 tablet by mouth Daily.      fluticasone (Flonase) 50 MCG/ACT nasal spray 2 sprays into the nostril(s) as directed by provider Daily. 2 puffs each nostril 16 g 3    Multiple Vitamins-Minerals (CENTRUM SILVER ULTRA WOMENS PO) Take  by mouth daily.      Probiotic Product (PROBIOTIC DAILY PO) Take  by mouth.      azithromycin (ZITHROMAX) 250 MG tablet TAKE 2 TABLETS BY MOUTH DAILY ON DAY 1, THEN TAKE 1 TABLET DAILY FOR 4 DAYS (Patient not taking: Reported on 9/6/2024)       No facility-administered medications prior to visit.  "    No opioid medication identified on active medication list. I have reviewed chart for other potential  high risk medication/s and harmful drug interactions in the elderly.      Aspirin is not on active medication list.  Aspirin use is not indicated based on review of current medical condition/s. Risk of harm outweighs potential benefits.  .    Patient Active Problem List   Diagnosis    Actinic keratosis    Arthritis    Osteoporosis    Screening for colon cancer    Encounter for Medicare annual wellness exam    Anxiety and depression     Advance Care Planning Advance Directive is not on file.  ACP discussion was held with the patient during this visit. Patient does not have an advance directive, information provided.            Objective   Vitals:    09/06/24 1003 09/06/24 1015   BP: 142/68 118/68   Pulse: 80    Resp: 16    Temp: 97.3 °F (36.3 °C)    SpO2: 98%    Weight: 44 kg (97 lb)    Height: 162.6 cm (64\")        Estimated body mass index is 16.65 kg/m² as calculated from the following:    Height as of this encounter: 162.6 cm (64\").    Weight as of this encounter: 44 kg (97 lb).    BMI is below normal parameters (malnutrition). Recommendations: Information on healthy weight added to patient's after visit summary       Does the patient have evidence of cognitive impairment? No  Physical Exam  Vitals and nursing note reviewed.   Constitutional:       General: She is not in acute distress.     Appearance: Normal appearance. She is underweight.   HENT:      Right Ear: Tympanic membrane and ear canal normal.      Left Ear: Tympanic membrane and ear canal normal.      Nose: Nose normal.      Mouth/Throat:      Mouth: Mucous membranes are moist.      Pharynx: Oropharynx is clear. No posterior oropharyngeal erythema.   Eyes:      Extraocular Movements: Extraocular movements intact.      Pupils: Pupils are equal, round, and reactive to light.   Neck:      Thyroid: No thyroid mass or thyromegaly.      Vascular: No " carotid bruit.   Cardiovascular:      Rate and Rhythm: Normal rate and regular rhythm.      Pulses: Normal pulses.      Heart sounds: Normal heart sounds. No murmur heard.  Pulmonary:      Effort: Pulmonary effort is normal.      Breath sounds: Normal breath sounds. No wheezing.   Abdominal:      General: Bowel sounds are normal. There is no distension.      Palpations: Abdomen is soft. There is no mass.      Tenderness: There is no abdominal tenderness.   Musculoskeletal:         General: Deformity present.      Cervical back: Normal range of motion and neck supple. No muscular tenderness.   Lymphadenopathy:      Head:      Right side of head: No submandibular, tonsillar, preauricular or posterior auricular adenopathy.      Left side of head: No submandibular, tonsillar, preauricular or posterior auricular adenopathy.      Cervical: No cervical adenopathy.   Skin:     General: Skin is warm and dry.      Capillary Refill: Capillary refill takes less than 2 seconds.      Findings: No bruising or rash.   Neurological:      General: No focal deficit present.      Mental Status: She is alert and oriented to person, place, and time.      Gait: Gait normal.      Deep Tendon Reflexes: Reflexes normal.   Psychiatric:         Mood and Affect: Mood normal.         Behavior: Behavior normal.                                                                                           Health  Risk Assessment    Smoking Status:  Social History     Tobacco Use   Smoking Status Never    Passive exposure: Never   Smokeless Tobacco Never     Alcohol Consumption:  Social History     Substance and Sexual Activity   Alcohol Use Yes    Comment: Social       Fall Risk Screen  STEADI Fall Risk Assessment was completed, and patient is at LOW risk for falls.Assessment completed on:2024    Depression Screenin/6/2024    10:00 AM   PHQ-2/PHQ-9 Depression Screening   Little Interest or Pleasure in Doing Things 0-->not at all   Feeling  Down, Depressed or Hopeless 0-->not at all   PHQ-9: Brief Depression Severity Measure Score 0     Health Habits and Functional and Cognitive Screenin/6/2024    10:00 AM   Functional & Cognitive Status   Do you have difficulty preparing food and eating? No   Do you have difficulty bathing yourself, getting dressed or grooming yourself? No   Do you have difficulty using the toilet? No   Do you have difficulty moving around from place to place? No   Do you have trouble with steps or getting out of a bed or a chair? No   Current Diet Well Balanced Diet   Dental Exam Up to date   Eye Exam Up to date   Exercise (times per week) 4 times per week   Current Exercises Include Walking;Light Weights   Do you need help using the phone?  No   Are you deaf or do you have serious difficulty hearing?  No   Do you need help to go to places out of walking distance? No   Do you need help shopping? No   Do you need help preparing meals?  No   Do you need help with housework?  No   Do you need help with laundry? No   Do you need help taking your medications? No   Do you need help managing money? No   Do you ever drive or ride in a car without wearing a seat belt? No   Have you felt unusual stress, anger or loneliness in the last month? No   Who do you live with? Spouse   If you need help, do you have trouble finding someone available to you? No   Have you been bothered in the last four weeks by sexual problems? No   Do you have difficulty concentrating, remembering or making decisions? No           Age-appropriate Screening Schedule:  Refer to the list below for future screening recommendations based on patient's age, sex and/or medical conditions. Orders for these recommended tests are listed in the plan section. The patient has been provided with a written plan.    Health Maintenance List  Health Maintenance   Topic Date Due    Pneumococcal Vaccine 65+ (2 of 2 - PCV) 10/13/2022    COVID-19 Vaccine (3 - 2023-24 season)  09/01/2024    ANNUAL WELLNESS VISIT  09/05/2024    INFLUENZA VACCINE  08/01/2024    MAMMOGRAM  09/11/2025    DXA SCAN  11/28/2025    COLORECTAL CANCER SCREENING  09/08/2026    HEPATITIS C SCREENING  Completed    PAP SMEAR  Discontinued    TDAP/TD VACCINES  Discontinued    ZOSTER VACCINE  Discontinued                                                                                                                                                CMS Preventative Services Quick Reference  Risk Factors Identified During Encounter  Immunizations Discussed/Encouraged: Prevnar 20 (Pneumococcal 20-valent conjugate)  Dental Screening Recommended  Vision Screening Recommended    The above risks/problems have been discussed with the patient.  Pertinent information has been shared with the patient in the After Visit Summary.  An After Visit Summary and PPPS were made available to the patient.    Follow Up:   Next Medicare Wellness visit to be scheduled in 1 year.     Assessment & Plan  Medicare annual wellness visit, subsequent  Preventative maintenance discussed during visit and information provided in AVS. Recommend PCV 20.  Immunization counseling  We discussed the risks and benefits of PCV 20    Counseling was given to inform of the potential signs and symptoms of adverse effects and when to seek medical attention.      The CDC Vaccination Information Sheet (VIS) was given for review prior to administration.  A copy of the VIS was also offered.      Anxiety and depression  Stable. Continue bupropion and buspirone at current dose. Recommend healthy diet, exercise as tolerated, daily sun exposure, medication compliance and talking with supportive family and friends.   Osteopenia of multiple sites  Continue estradiol managed by GYN, stay active with light weight bearing exercise, increase calcium in diet and vitamin d encouraged.   Protein-calorie malnutrition, unspecified severity  Patient's Body mass index is 16.65 kg/m².  BMI  indicates mild protein-calorie malnutrition with health conditions related to an underlying condition that include n/a . Weight issue is unchanged. BMI is is below average; no BMI management plan is appropriate.  BMI management for patient includes the following: Information on healthy weight added to patient's after visit summary.         Follow Up:   Return in about 1 year (around 9/6/2025) for Medicare Wellness.

## 2024-09-07 LAB
ALBUMIN SERPL-MCNC: 4.3 G/DL (ref 3.5–5.2)
ALBUMIN/GLOB SERPL: 1.4 G/DL
ALP SERPL-CCNC: 46 U/L (ref 39–117)
ALT SERPL-CCNC: 19 U/L (ref 1–33)
AST SERPL-CCNC: 26 U/L (ref 1–32)
BILIRUB SERPL-MCNC: 0.3 MG/DL (ref 0–1.2)
BUN SERPL-MCNC: 9 MG/DL (ref 8–23)
BUN/CREAT SERPL: 17 (ref 7–25)
CALCIUM SERPL-MCNC: 9.1 MG/DL (ref 8.6–10.5)
CHLORIDE SERPL-SCNC: 97 MMOL/L (ref 98–107)
CHOLEST SERPL-MCNC: 171 MG/DL (ref 0–200)
CO2 SERPL-SCNC: 26.6 MMOL/L (ref 22–29)
CREAT SERPL-MCNC: 0.53 MG/DL (ref 0.57–1)
EGFRCR SERPLBLD CKD-EPI 2021: 99 ML/MIN/1.73
ERYTHROCYTE [DISTWIDTH] IN BLOOD BY AUTOMATED COUNT: 12.5 % (ref 12.3–15.4)
GLOBULIN SER CALC-MCNC: 3 GM/DL
GLUCOSE SERPL-MCNC: 81 MG/DL (ref 65–99)
HCT VFR BLD AUTO: 37.3 % (ref 34–46.6)
HDLC SERPL-MCNC: 82 MG/DL (ref 40–60)
HGB BLD-MCNC: 12.5 G/DL (ref 12–15.9)
LDLC SERPL CALC-MCNC: 77 MG/DL (ref 0–100)
MCH RBC QN AUTO: 31.1 PG (ref 26.6–33)
MCHC RBC AUTO-ENTMCNC: 33.5 G/DL (ref 31.5–35.7)
MCV RBC AUTO: 92.8 FL (ref 79–97)
PLATELET # BLD AUTO: 293 10*3/MM3 (ref 140–450)
POTASSIUM SERPL-SCNC: 4.8 MMOL/L (ref 3.5–5.2)
PROT SERPL-MCNC: 7.3 G/DL (ref 6–8.5)
RBC # BLD AUTO: 4.02 10*6/MM3 (ref 3.77–5.28)
SODIUM SERPL-SCNC: 132 MMOL/L (ref 136–145)
TRIGL SERPL-MCNC: 63 MG/DL (ref 0–150)
TSH SERPL DL<=0.005 MIU/L-ACNC: 3.43 UIU/ML (ref 0.27–4.2)
VIT B12 SERPL-MCNC: 301 PG/ML (ref 211–946)
VLDLC SERPL CALC-MCNC: 12 MG/DL (ref 5–40)
WBC # BLD AUTO: 4.59 10*3/MM3 (ref 3.4–10.8)

## 2024-09-10 DIAGNOSIS — E87.1 HYPONATREMIA: Primary | ICD-10-CM

## 2024-11-25 RX ORDER — BUSPIRONE HYDROCHLORIDE 10 MG/1
10 TABLET ORAL 3 TIMES DAILY
Qty: 90 TABLET | Refills: 3 | Status: SHIPPED | OUTPATIENT
Start: 2024-11-25

## 2024-12-10 ENCOUNTER — TELEPHONE (OUTPATIENT)
Dept: INTERNAL MEDICINE | Facility: CLINIC | Age: 69
End: 2024-12-10
Payer: MEDICARE

## 2024-12-10 NOTE — TELEPHONE ENCOUNTER
Spoke to patient offered an appointment for tomorrow with Dr. Painter. She has decided to wait to see how she feels tomorrow. Patient states she has not taken a COVID test. She states her cough is Dry and her nose if dry. Patient still encouraged to test for COVID. She was given OTC recommendations such as AYR spray or gel to help with dry nose & cough syrup, cough drops or throat lozenges. Drinking plenty of water. She has been using Dayquil and Nightquil I advised to stop those as they will dry her out further. I let her know [facility] or in person [facility] is an option also.

## 2024-12-10 NOTE — TELEPHONE ENCOUNTER
Patient called stating she has a dry cough and some nasal congestion. She has had these since Saturday. She would like worked in with meg only. Call her at [phone]

## 2025-02-18 RX ORDER — ESTRADIOL 2 MG/1
2 TABLET ORAL DAILY
Qty: 90 TABLET | Refills: 1 | Status: SHIPPED | OUTPATIENT
Start: 2025-02-18

## 2025-02-18 NOTE — TELEPHONE ENCOUNTER
Historical provider, do you want to manage this medication?Rx Refill Note  Requested Prescriptions     Pending Prescriptions Disp Refills    estradiol (ESTRACE) 2 MG tablet       Sig: Take 1 tablet by mouth Daily.      Last office visit with prescribing clinician: 9/6/2024   Last telemedicine visit with prescribing clinician: Visit date not found   Next office visit with prescribing clinician: 9/9/2025                         Would you like a call back once the refill request has been completed: [] Yes [] No    If the office needs to give you a call back, can they leave a voicemail: [] Yes [] No    Farida Gordon, [facility]  02/18/25, 13:36 EST

## 2025-04-26 RX ORDER — BUPROPION HYDROCHLORIDE 150 MG/1
150 TABLET ORAL EVERY MORNING
Qty: 90 TABLET | Refills: 3 | Status: SHIPPED | OUTPATIENT
Start: 2025-04-26

## 2025-06-02 RX ORDER — BUPROPION HYDROCHLORIDE 150 MG/1
150 TABLET ORAL EVERY MORNING
Qty: 90 TABLET | Refills: 3 | Status: SHIPPED | OUTPATIENT
Start: 2025-06-02

## 2025-06-02 NOTE — TELEPHONE ENCOUNTER
"Caller: Lyssa Neal \"Shauna\"    Relationship: Self    Best call back number: [phone]     Requested Prescriptions:   Requested Prescriptions     Pending Prescriptions Disp Refills    buPROPion XL (WELLBUTRIN XL) 150 MG 24 hr tablet 90 tablet 3     Sig: Take 1 tablet by mouth Every Morning.        Pharmacy where request should be sent: [id number] - [place], [place] - [address] - [phone] [facility] [phone]      Last office visit with prescribing clinician: 9/6/2024   Last telemedicine visit with prescribing clinician: Visit date not found   Next office visit with prescribing clinician: 9/9/2025     Additional details provided by patient: PATIENT'S MAIL ORDER PHARMACY SENT HER MEDICATION TO HER [place] ADDRESS. IT WILL TAKE A FEW DAYS FOR HER MEDICATION TO REACH HER FROM [place]. SHE WOULD LIKE TO KNOW IF SHE MAY BE PRESCRIBED A FEW DAYS WORTH TO HOLD HER OVER UNTIL SHE RECEIVES IT. PATIENT IS OUT    Does the patient have less than a 3 day supply:  [x] Yes  [] No    Would you like a call back once the refill request has been completed: [] Yes [x] No    If the office needs to give you a call back, can they leave a voicemail: [] Yes [x] No    Margarita Ragsdale   06/02/25 12:19 EDT         "

## 2025-07-24 RX ORDER — ESTRADIOL 2 MG/1
2 TABLET ORAL DAILY
Qty: 90 TABLET | Refills: 3 | Status: SHIPPED | OUTPATIENT
Start: 2025-07-24